# Patient Record
Sex: FEMALE | Race: ASIAN | NOT HISPANIC OR LATINO | URBAN - METROPOLITAN AREA
[De-identification: names, ages, dates, MRNs, and addresses within clinical notes are randomized per-mention and may not be internally consistent; named-entity substitution may affect disease eponyms.]

---

## 2018-03-21 ENCOUNTER — EMERGENCY (EMERGENCY)
Facility: HOSPITAL | Age: 68
LOS: 1 days | Discharge: HOME | End: 2018-03-21
Admitting: EMERGENCY MEDICINE
Payer: MEDICAID

## 2018-03-21 VITALS
HEART RATE: 108 BPM | TEMPERATURE: 97 F | SYSTOLIC BLOOD PRESSURE: 155 MMHG | RESPIRATION RATE: 18 BRPM | DIASTOLIC BLOOD PRESSURE: 68 MMHG | OXYGEN SATURATION: 99 %

## 2018-03-21 DIAGNOSIS — Z98.890 OTHER SPECIFIED POSTPROCEDURAL STATES: Chronic | ICD-10-CM

## 2018-03-21 PROCEDURE — 73070 X-RAY EXAM OF ELBOW: CPT | Mod: 26,LT

## 2018-03-21 PROCEDURE — 99284 EMERGENCY DEPT VISIT MOD MDM: CPT

## 2018-03-21 PROCEDURE — 73030 X-RAY EXAM OF SHOULDER: CPT | Mod: 26,LT

## 2018-03-21 PROCEDURE — 73060 X-RAY EXAM OF HUMERUS: CPT | Mod: 26,LT

## 2018-03-21 RX ORDER — OXYCODONE AND ACETAMINOPHEN 5; 325 MG/1; MG/1
1 TABLET ORAL ONCE
Qty: 0 | Refills: 0 | Status: DISCONTINUED | OUTPATIENT
Start: 2018-03-21 | End: 2018-03-21

## 2018-03-21 RX ORDER — OXYCODONE AND ACETAMINOPHEN 5; 325 MG/1; MG/1
1 TABLET ORAL
Qty: 12 | Refills: 0
Start: 2018-03-21 | End: 2018-03-23

## 2018-03-21 RX ORDER — IBUPROFEN 200 MG
1 TABLET ORAL
Qty: 20 | Refills: 0
Start: 2018-03-21 | End: 2018-03-25

## 2018-03-21 RX ADMIN — OXYCODONE AND ACETAMINOPHEN 1 TABLET(S): 5; 325 TABLET ORAL at 16:12

## 2018-03-21 NOTE — ED ADULT TRIAGE NOTE - CHIEF COMPLAINT QUOTE
# [id number]  s/p fall.  Trip and fall yesterday.  Denies head trauma and no dizziness. Arrives with makeshift sling in place to left arm.  Positive radial pulse, capillary refill

## 2018-03-21 NOTE — ED PROVIDER NOTE - PLAN OF CARE
Percocet 1 tablet every 6 hrs as needed for breakthrough discomfort- caution drowsiness while taking this medication- do not drive or operate heavy machinery. Limit further injury, over exertion, and rest affected area. Take motrin 600mg every 6h as needed for pain. Please continue all home medications as directed. See your regular doctor within 72 hours for follow-up care. Return to ER for new or worsening symptoms.

## 2018-03-21 NOTE — ED PROVIDER NOTE - OBJECTIVE STATEMENT
68 year old female with a PMHx of breast ca s/p bilateral mastectomy, DM, HLD pw left shoulder, left elbow pain s/p mechanical fall yesterday. Pt slipped on ice and fell onto her left elbow. Pt has decreased ROM of shoulder. Denies head trauma, blood thinner use, neck pain, n/v/f/c, CP, SOB, abdominal pain, dysuria, hematuria, melena, hematochezia. 68 year old female with a PMHx of breast ca s/p bilateral mastectomy, DM, HLD pw left shoulder, left elbow pain s/p mechanical fall yesterday. Pt slipped on ice and fell onto her left elbow. Pt has decreased ROM of shoulder. Denies head trauma, blood thinner use, neck pain, n/v/f/c, CP, SOB, abdominal pain, dysuria, hematuria, melena, hematochezia.  Pt English speaking - son at bedside - prefers using son to translate.

## 2018-03-21 NOTE — ED PROVIDER NOTE - PROGRESS NOTE DETAILS
DOUG Roth: spoke with orthopedics resident - wants the patient to be put in a sling and follow up with Dr. Diggs within 1 week DOUG Roth: spoke with orthopedics resident - wants the patient to be put in a sling and follow up with Dr. Diggs within 1 week. As per ortho no need for reduction/surgical intervention.

## 2018-03-21 NOTE — ED PROVIDER NOTE - CARE PLAN
Principal Discharge DX:	Humerus fracture  Assessment and plan of treatment:	Percocet 1 tablet every 6 hrs as needed for breakthrough discomfort- caution drowsiness while taking this medication- do not drive or operate heavy machinery. Limit further injury, over exertion, and rest affected area. Take motrin 600mg every 6h as needed for pain. Please continue all home medications as directed. See your regular doctor within 72 hours for follow-up care. Return to ER for new or worsening symptoms.

## 2018-03-21 NOTE — ED PROVIDER NOTE - MEDICAL DECISION MAKING DETAILS
68 year old female with a PMHx of breast ca s/p bilateral mastectomy, DM, HLD pw left shoulder, left elbow pain s/p mechanical fall yesterday.   Plan: pain management, xray shoulder/elbow/humerus r/o fracture

## 2018-03-22 PROBLEM — Z00.00 ENCOUNTER FOR PREVENTIVE HEALTH EXAMINATION: Status: ACTIVE | Noted: 2018-03-22

## 2018-03-26 ENCOUNTER — APPOINTMENT (OUTPATIENT)
Dept: ORTHOPEDIC SURGERY | Facility: CLINIC | Age: 68
End: 2018-03-26
Payer: MEDICAID

## 2018-03-26 VITALS
WEIGHT: 138 LBS | SYSTOLIC BLOOD PRESSURE: 168 MMHG | HEIGHT: 63 IN | RESPIRATION RATE: 16 BRPM | BODY MASS INDEX: 24.45 KG/M2 | HEART RATE: 96 BPM | DIASTOLIC BLOOD PRESSURE: 85 MMHG

## 2018-03-26 DIAGNOSIS — S42.292A OTHER DISPLACED FRACTURE OF UPPER END OF LEFT HUMERUS, INITIAL ENCOUNTER FOR CLOSED FRACTURE: ICD-10-CM

## 2018-03-26 PROCEDURE — 73030 X-RAY EXAM OF SHOULDER: CPT | Mod: LT

## 2018-03-26 PROCEDURE — 99203 OFFICE O/P NEW LOW 30 MIN: CPT

## 2018-03-26 PROCEDURE — 73060 X-RAY EXAM OF HUMERUS: CPT | Mod: LT

## 2018-03-30 PROBLEM — S42.292A OTHER CLOSED DISPLACED FRACTURE OF PROXIMAL END OF LEFT HUMERUS, INITIAL ENCOUNTER: Status: ACTIVE | Noted: 2018-03-26

## 2023-01-02 ENCOUNTER — TRANSCRIPTION ENCOUNTER (OUTPATIENT)
Age: 73
End: 2023-01-02

## 2023-01-02 ENCOUNTER — INPATIENT (INPATIENT)
Facility: HOSPITAL | Age: 73
LOS: 1 days | Discharge: HOME HEALTH | End: 2023-01-04
Attending: INTERNAL MEDICINE | Admitting: INTERNAL MEDICINE
Payer: MEDICAID

## 2023-01-02 VITALS
OXYGEN SATURATION: 100 % | HEART RATE: 78 BPM | SYSTOLIC BLOOD PRESSURE: 175 MMHG | DIASTOLIC BLOOD PRESSURE: 71 MMHG | RESPIRATION RATE: 17 BRPM | TEMPERATURE: 97 F

## 2023-01-02 DIAGNOSIS — R19.7 DIARRHEA, UNSPECIFIED: ICD-10-CM

## 2023-01-02 DIAGNOSIS — R53.1 WEAKNESS: ICD-10-CM

## 2023-01-02 DIAGNOSIS — Z98.890 OTHER SPECIFIED POSTPROCEDURAL STATES: Chronic | ICD-10-CM

## 2023-01-02 DIAGNOSIS — E16.2 HYPOGLYCEMIA, UNSPECIFIED: ICD-10-CM

## 2023-01-02 DIAGNOSIS — E11.9 TYPE 2 DIABETES MELLITUS WITHOUT COMPLICATIONS: ICD-10-CM

## 2023-01-02 DIAGNOSIS — E87.20 ACIDOSIS, UNSPECIFIED: ICD-10-CM

## 2023-01-02 DIAGNOSIS — E87.1 HYPO-OSMOLALITY AND HYPONATREMIA: ICD-10-CM

## 2023-01-02 DIAGNOSIS — Z29.9 ENCOUNTER FOR PROPHYLACTIC MEASURES, UNSPECIFIED: ICD-10-CM

## 2023-01-02 PROBLEM — C50.919 MALIGNANT NEOPLASM OF UNSPECIFIED SITE OF UNSPECIFIED FEMALE BREAST: Chronic | Status: ACTIVE | Noted: 2018-03-21

## 2023-01-02 PROBLEM — E78.5 HYPERLIPIDEMIA, UNSPECIFIED: Chronic | Status: ACTIVE | Noted: 2018-03-21

## 2023-01-02 LAB
A1C WITH ESTIMATED AVERAGE GLUCOSE RESULT: 6.3 % — HIGH (ref 4–5.6)
ALBUMIN SERPL ELPH-MCNC: 4.3 G/DL — SIGNIFICANT CHANGE UP (ref 3.3–5)
ALP SERPL-CCNC: 56 U/L — SIGNIFICANT CHANGE UP (ref 40–120)
ALT FLD-CCNC: 13 U/L — SIGNIFICANT CHANGE UP (ref 4–33)
ANION GAP SERPL CALC-SCNC: 14 MMOL/L — SIGNIFICANT CHANGE UP (ref 7–14)
APPEARANCE UR: CLEAR — SIGNIFICANT CHANGE UP
AST SERPL-CCNC: 20 U/L — SIGNIFICANT CHANGE UP (ref 4–32)
B PERT DNA SPEC QL NAA+PROBE: SIGNIFICANT CHANGE UP
B PERT+PARAPERT DNA PNL SPEC NAA+PROBE: SIGNIFICANT CHANGE UP
BASOPHILS # BLD AUTO: 0.01 K/UL — SIGNIFICANT CHANGE UP (ref 0–0.2)
BASOPHILS NFR BLD AUTO: 0.1 % — SIGNIFICANT CHANGE UP (ref 0–2)
BILIRUB SERPL-MCNC: 0.2 MG/DL — SIGNIFICANT CHANGE UP (ref 0.2–1.2)
BILIRUB UR-MCNC: NEGATIVE — SIGNIFICANT CHANGE UP
BLOOD GAS VENOUS COMPREHENSIVE RESULT: SIGNIFICANT CHANGE UP
BLOOD GAS VENOUS COMPREHENSIVE RESULT: SIGNIFICANT CHANGE UP
BORDETELLA PARAPERTUSSIS (RAPRVP): SIGNIFICANT CHANGE UP
BUN SERPL-MCNC: 8 MG/DL — SIGNIFICANT CHANGE UP (ref 7–23)
C DIFF BY PCR RESULT: SIGNIFICANT CHANGE UP
C PNEUM DNA SPEC QL NAA+PROBE: SIGNIFICANT CHANGE UP
CALCIUM SERPL-MCNC: 8.9 MG/DL — SIGNIFICANT CHANGE UP (ref 8.4–10.5)
CHLORIDE SERPL-SCNC: 95 MMOL/L — LOW (ref 98–107)
CO2 SERPL-SCNC: 22 MMOL/L — SIGNIFICANT CHANGE UP (ref 22–31)
COLOR SPEC: COLORLESS — SIGNIFICANT CHANGE UP
CREAT SERPL-MCNC: 0.54 MG/DL — SIGNIFICANT CHANGE UP (ref 0.5–1.3)
DIFF PNL FLD: NEGATIVE — SIGNIFICANT CHANGE UP
EGFR: 99 ML/MIN/1.73M2 — SIGNIFICANT CHANGE UP
EOSINOPHIL # BLD AUTO: 0.04 K/UL — SIGNIFICANT CHANGE UP (ref 0–0.5)
EOSINOPHIL NFR BLD AUTO: 0.4 % — SIGNIFICANT CHANGE UP (ref 0–6)
ESTIMATED AVERAGE GLUCOSE: 134 — SIGNIFICANT CHANGE UP
FLUAV SUBTYP SPEC NAA+PROBE: SIGNIFICANT CHANGE UP
FLUBV RNA SPEC QL NAA+PROBE: SIGNIFICANT CHANGE UP
GI PCR PANEL: SIGNIFICANT CHANGE UP
GLUCOSE BLDC GLUCOMTR-MCNC: 206 MG/DL — HIGH (ref 70–99)
GLUCOSE BLDC GLUCOMTR-MCNC: 275 MG/DL — HIGH (ref 70–99)
GLUCOSE SERPL-MCNC: 129 MG/DL — HIGH (ref 70–99)
GLUCOSE UR QL: ABNORMAL
HADV DNA SPEC QL NAA+PROBE: SIGNIFICANT CHANGE UP
HCOV 229E RNA SPEC QL NAA+PROBE: SIGNIFICANT CHANGE UP
HCOV HKU1 RNA SPEC QL NAA+PROBE: SIGNIFICANT CHANGE UP
HCOV NL63 RNA SPEC QL NAA+PROBE: SIGNIFICANT CHANGE UP
HCOV OC43 RNA SPEC QL NAA+PROBE: SIGNIFICANT CHANGE UP
HCT VFR BLD CALC: 32.1 % — LOW (ref 34.5–45)
HGB BLD-MCNC: 10.3 G/DL — LOW (ref 11.5–15.5)
HMPV RNA SPEC QL NAA+PROBE: SIGNIFICANT CHANGE UP
HPIV1 RNA SPEC QL NAA+PROBE: SIGNIFICANT CHANGE UP
HPIV2 RNA SPEC QL NAA+PROBE: SIGNIFICANT CHANGE UP
HPIV3 RNA SPEC QL NAA+PROBE: SIGNIFICANT CHANGE UP
HPIV4 RNA SPEC QL NAA+PROBE: SIGNIFICANT CHANGE UP
IANC: 7.03 K/UL — SIGNIFICANT CHANGE UP (ref 1.8–7.4)
IMM GRANULOCYTES NFR BLD AUTO: 0.4 % — SIGNIFICANT CHANGE UP (ref 0–0.9)
KETONES UR-MCNC: NEGATIVE — SIGNIFICANT CHANGE UP
LACTATE SERPL-SCNC: 2.8 MMOL/L — HIGH (ref 0.5–2)
LEUKOCYTE ESTERASE UR-ACNC: NEGATIVE — SIGNIFICANT CHANGE UP
LIDOCAIN IGE QN: 171 U/L — HIGH (ref 7–60)
LYMPHOCYTES # BLD AUTO: 1.56 K/UL — SIGNIFICANT CHANGE UP (ref 1–3.3)
LYMPHOCYTES # BLD AUTO: 17.2 % — SIGNIFICANT CHANGE UP (ref 13–44)
M PNEUMO DNA SPEC QL NAA+PROBE: SIGNIFICANT CHANGE UP
MAGNESIUM SERPL-MCNC: 1.5 MG/DL — LOW (ref 1.6–2.6)
MCHC RBC-ENTMCNC: 26.9 PG — LOW (ref 27–34)
MCHC RBC-ENTMCNC: 32.1 GM/DL — SIGNIFICANT CHANGE UP (ref 32–36)
MCV RBC AUTO: 83.8 FL — SIGNIFICANT CHANGE UP (ref 80–100)
MONOCYTES # BLD AUTO: 0.41 K/UL — SIGNIFICANT CHANGE UP (ref 0–0.9)
MONOCYTES NFR BLD AUTO: 4.5 % — SIGNIFICANT CHANGE UP (ref 2–14)
NEUTROPHILS # BLD AUTO: 7.03 K/UL — SIGNIFICANT CHANGE UP (ref 1.8–7.4)
NEUTROPHILS NFR BLD AUTO: 77.4 % — HIGH (ref 43–77)
NITRITE UR-MCNC: NEGATIVE — SIGNIFICANT CHANGE UP
NRBC # BLD: 0 /100 WBCS — SIGNIFICANT CHANGE UP (ref 0–0)
NRBC # FLD: 0 K/UL — SIGNIFICANT CHANGE UP (ref 0–0)
PH UR: 7 — SIGNIFICANT CHANGE UP (ref 5–8)
PHOSPHATE SERPL-MCNC: 3.3 MG/DL — SIGNIFICANT CHANGE UP (ref 2.5–4.5)
PLATELET # BLD AUTO: 182 K/UL — SIGNIFICANT CHANGE UP (ref 150–400)
POTASSIUM SERPL-MCNC: 4 MMOL/L — SIGNIFICANT CHANGE UP (ref 3.5–5.3)
POTASSIUM SERPL-SCNC: 4 MMOL/L — SIGNIFICANT CHANGE UP (ref 3.5–5.3)
PROT SERPL-MCNC: 6.8 G/DL — SIGNIFICANT CHANGE UP (ref 6–8.3)
PROT UR-MCNC: NEGATIVE — SIGNIFICANT CHANGE UP
RAPID RVP RESULT: SIGNIFICANT CHANGE UP
RBC # BLD: 3.83 M/UL — SIGNIFICANT CHANGE UP (ref 3.8–5.2)
RBC # FLD: 13.3 % — SIGNIFICANT CHANGE UP (ref 10.3–14.5)
RSV RNA SPEC QL NAA+PROBE: SIGNIFICANT CHANGE UP
RV+EV RNA SPEC QL NAA+PROBE: SIGNIFICANT CHANGE UP
SARS-COV-2 RNA SPEC QL NAA+PROBE: SIGNIFICANT CHANGE UP
SODIUM SERPL-SCNC: 131 MMOL/L — LOW (ref 135–145)
SP GR SPEC: 1.01 — SIGNIFICANT CHANGE UP (ref 1.01–1.05)
UROBILINOGEN FLD QL: SIGNIFICANT CHANGE UP
WBC # BLD: 9.09 K/UL — SIGNIFICANT CHANGE UP (ref 3.8–10.5)
WBC # FLD AUTO: 9.09 K/UL — SIGNIFICANT CHANGE UP (ref 3.8–10.5)

## 2023-01-02 PROCEDURE — 74177 CT ABD & PELVIS W/CONTRAST: CPT | Mod: 26,MA

## 2023-01-02 PROCEDURE — 99223 1ST HOSP IP/OBS HIGH 75: CPT

## 2023-01-02 PROCEDURE — 99285 EMERGENCY DEPT VISIT HI MDM: CPT

## 2023-01-02 RX ORDER — ONDANSETRON 8 MG/1
4 TABLET, FILM COATED ORAL ONCE
Refills: 0 | Status: COMPLETED | OUTPATIENT
Start: 2023-01-02 | End: 2023-01-02

## 2023-01-02 RX ORDER — INSULIN LISPRO 100/ML
VIAL (ML) SUBCUTANEOUS
Refills: 0 | Status: DISCONTINUED | OUTPATIENT
Start: 2023-01-02 | End: 2023-01-04

## 2023-01-02 RX ORDER — GLUCAGON INJECTION, SOLUTION 0.5 MG/.1ML
1 INJECTION, SOLUTION SUBCUTANEOUS ONCE
Refills: 0 | Status: DISCONTINUED | OUTPATIENT
Start: 2023-01-02 | End: 2023-01-04

## 2023-01-02 RX ORDER — DEXTROSE 50 % IN WATER 50 %
25 SYRINGE (ML) INTRAVENOUS ONCE
Refills: 0 | Status: DISCONTINUED | OUTPATIENT
Start: 2023-01-02 | End: 2023-01-04

## 2023-01-02 RX ORDER — GABAPENTIN 400 MG/1
300 CAPSULE ORAL AT BEDTIME
Refills: 0 | Status: DISCONTINUED | OUTPATIENT
Start: 2023-01-02 | End: 2023-01-04

## 2023-01-02 RX ORDER — ACETAMINOPHEN 500 MG
650 TABLET ORAL EVERY 6 HOURS
Refills: 0 | Status: DISCONTINUED | OUTPATIENT
Start: 2023-01-02 | End: 2023-01-04

## 2023-01-02 RX ORDER — SODIUM CHLORIDE 9 MG/ML
1000 INJECTION INTRAMUSCULAR; INTRAVENOUS; SUBCUTANEOUS ONCE
Refills: 0 | Status: COMPLETED | OUTPATIENT
Start: 2023-01-02 | End: 2023-01-02

## 2023-01-02 RX ORDER — ENOXAPARIN SODIUM 100 MG/ML
40 INJECTION SUBCUTANEOUS EVERY 24 HOURS
Refills: 0 | Status: DISCONTINUED | OUTPATIENT
Start: 2023-01-02 | End: 2023-01-04

## 2023-01-02 RX ORDER — SIMVASTATIN 20 MG/1
20 TABLET, FILM COATED ORAL AT BEDTIME
Refills: 0 | Status: DISCONTINUED | OUTPATIENT
Start: 2023-01-02 | End: 2023-01-04

## 2023-01-02 RX ORDER — DEXTROSE 50 % IN WATER 50 %
15 SYRINGE (ML) INTRAVENOUS ONCE
Refills: 0 | Status: DISCONTINUED | OUTPATIENT
Start: 2023-01-02 | End: 2023-01-04

## 2023-01-02 RX ORDER — SODIUM CHLORIDE 9 MG/ML
1000 INJECTION, SOLUTION INTRAVENOUS
Refills: 0 | Status: DISCONTINUED | OUTPATIENT
Start: 2023-01-02 | End: 2023-01-04

## 2023-01-02 RX ORDER — DEXTROSE 50 % IN WATER 50 %
50 SYRINGE (ML) INTRAVENOUS ONCE
Refills: 0 | Status: COMPLETED | OUTPATIENT
Start: 2023-01-02 | End: 2023-01-02

## 2023-01-02 RX ORDER — MAGNESIUM SULFATE 500 MG/ML
2 VIAL (ML) INJECTION ONCE
Refills: 0 | Status: COMPLETED | OUTPATIENT
Start: 2023-01-02 | End: 2023-01-02

## 2023-01-02 RX ORDER — DEXTROSE 50 % IN WATER 50 %
12.5 SYRINGE (ML) INTRAVENOUS ONCE
Refills: 0 | Status: DISCONTINUED | OUTPATIENT
Start: 2023-01-02 | End: 2023-01-04

## 2023-01-02 RX ORDER — ONDANSETRON 8 MG/1
4 TABLET, FILM COATED ORAL EVERY 8 HOURS
Refills: 0 | Status: DISCONTINUED | OUTPATIENT
Start: 2023-01-02 | End: 2023-01-04

## 2023-01-02 RX ORDER — LANOLIN ALCOHOL/MO/W.PET/CERES
3 CREAM (GRAM) TOPICAL AT BEDTIME
Refills: 0 | Status: DISCONTINUED | OUTPATIENT
Start: 2023-01-02 | End: 2023-01-04

## 2023-01-02 RX ORDER — SODIUM CHLORIDE 9 MG/ML
1000 INJECTION, SOLUTION INTRAVENOUS
Refills: 0 | Status: DISCONTINUED | OUTPATIENT
Start: 2023-01-02 | End: 2023-01-03

## 2023-01-02 RX ADMIN — SODIUM CHLORIDE 1000 MILLILITER(S): 9 INJECTION INTRAMUSCULAR; INTRAVENOUS; SUBCUTANEOUS at 10:53

## 2023-01-02 RX ADMIN — Medication 25 GRAM(S): at 14:40

## 2023-01-02 RX ADMIN — ONDANSETRON 4 MILLIGRAM(S): 8 TABLET, FILM COATED ORAL at 11:08

## 2023-01-02 RX ADMIN — SODIUM CHLORIDE 100 MILLILITER(S): 9 INJECTION, SOLUTION INTRAVENOUS at 14:41

## 2023-01-02 RX ADMIN — Medication 50 MILLILITER(S): at 10:54

## 2023-01-02 RX ADMIN — Medication 3: at 22:50

## 2023-01-02 RX ADMIN — SODIUM CHLORIDE 1000 MILLILITER(S): 9 INJECTION INTRAMUSCULAR; INTRAVENOUS; SUBCUTANEOUS at 14:40

## 2023-01-02 RX ADMIN — GABAPENTIN 300 MILLIGRAM(S): 400 CAPSULE ORAL at 22:44

## 2023-01-02 NOTE — H&P ADULT - PROBLEM SELECTOR PLAN 1
Will keep track of BMs, if patient continues to persistent loose BMs will get stool studies and c.diff  WBC normal, no fevers noted  CT a/p unremarkable   Continue gentle IVF for now  Potentially in the setting of Janumet and Metfromin?

## 2023-01-02 NOTE — H&P ADULT - ASSESSMENT
72 year old lady h/o breast CA s/p B/L mastectomy, T2DM (Janumet/metformin/glimizide, HLD) p/w weakness and non specific GI complaints which have improved over the last two weeks. CT A/P appears mostly unremarkable, patient w/lactic acidosis on admission, though improving on subsequent draws. Also with hyponatremia, possibly 2/2 volume depletion.

## 2023-01-02 NOTE — ED PROVIDER NOTE - NSICDXPASTMEDICALHX_GEN_ALL_CORE_FT
PAST MEDICAL HISTORY:  Breast CA     Diabetes     HLD (hyperlipidemia)      DM 2 , does not monitor BG

## 2023-01-02 NOTE — H&P ADULT - PROBLEM SELECTOR PLAN 6
Lovenox 40 mg daily Lovenox 40 mg daily  Hold treatment for H.Pylori (no evidence of infection) Likely 2/2 hypovolemia  Check with AM labs

## 2023-01-02 NOTE — ED PROVIDER NOTE - OBJECTIVE STATEMENT
73yo F with PMHx of breast ca s/p bilateral mastectomy, DM on Janumet/metformin/glimiperide, HLD presents to ED for eval for weakness. For last 2wk she's had nausea/vomiting & watery diarrhea - not worsening but not improving. Intolerance to solids, with 'sticking' sensation but mostly nausea/vomiting but able take soft and liquid foods. Went to pcp 3d ago who gave her triple abbx tx for suspected Hpylori. Started having GI issues 4yo ago and had a ecd/colo with Dr Rendon w/o result. Today per son pt was weak and unable to get off couch with some 'tingling' of upper extremities which is how EMS found her. FS w/ EMS ~80s. Pt denies . On arrival FS 42. Denies localized weakness, loss of sensation, HA, visual changes, CP, SOB. Has some generalized discomfort and bloating but not abd pain. No blood in stool. 71yo F with PMHx of breast ca s/p bilateral mastectomy, DM on Janumet/metformin/glimiperide, HLD presents to ED for eval for weakness. For last 2wk she's had nausea/vomiting & watery diarrhea - not worsening but not improving. Intolerance to solids, with 'sticking' sensation but mostly nausea/vomiting but able take soft and liquid foods. Went to pcp 3d ago who gave her triple abbx tx for suspected Hpylori. Started having GI issues 4yo ago and had a ecd/colo with Dr Rendon w/o result. Today per son pt was weak and unable to get off couch with some 'tingling' of upper extremities which is how EMS found her. FS w/ EMS ~80s. Pt denies . On arrival FS 42. Denies localized weakness, loss of sensation, HA, visual changes, CP, SOB. Has some generalized discomfort and bloating but not abd pain. No blood in stool.    Attending/Chang: 71 yo F as described above, ho DM p/w ~two weeks fo nonbloody n/v/d with mild epigastric discomfort. Denies fever/chills, chest pain, SOB or palp. No recent traveling, no sick contacts. Pt son, who is at bedside, that ~one year ago his mother had an EGD/Colonoscopy, which as reportedly normal. Pt seen and eval by her PMD three days ago for the above complaints and stated treated for H/pylori. Pt here for continued n/v/d and now increase fatigue and generalized weakness. 71yo F with PMHx of breast ca s/p bilateral mastectomy, DM on Janumet/metformin/glimizide, HLD presents to ED for eval for weakness. For last 2wk she's had nausea/vomiting & watery diarrhea - not worsening but not improving. Intolerance to solids, with 'sticking' sensation but mostly nausea/vomiting but able take soft and liquid foods. Went to pcp 3d ago who gave her triple abbx tx for suspected Hpylori. Started having GI issues 4yo ago and had a ecd/colo with Dr Rendon w/o result. Today per son pt was weak and unable to get off couch with some 'tingling' of upper extremities which is how EMS found her. FS w/ EMS ~80s. Pt denies . On arrival FS 42. Denies localized weakness, loss of sensation, HA, visual changes, CP, SOB. Has some generalized discomfort and bloating but not abd pain. No blood in stool.    Attending/Chang: 73 yo F as described above, ho DM p/w ~two weeks fo nonbloody n/v/d with mild epigastric discomfort. Denies fever/chills, chest pain, SOB or palp. No recent traveling, no sick contacts. Pt son, who is at bedside, that ~one year ago his mother had an EGD/Colonoscopy, which as reportedly normal. Pt seen and eval by her PMD three days ago for the above complaints and stated treated for H/pylori. Pt here for continued n/v/d and now increase fatigue and generalized weakness.

## 2023-01-02 NOTE — ED PROVIDER NOTE - PHYSICAL EXAMINATION
CONSTITUTIONAL: thin tired appearing F, AAOx3 but fatigued  SKIN: Warm dry  HEAD: NCAT  EYES: NL inspection  ENT: dry oral mucosa  NECK: Supple; non tender.  CARD: RRR  RESP: CTAB  ABD: soft, NTND, normoactive bowel sounds  EXT: no pedal edema  NEURO: Grossly non focal, no drift, intact touch sensation, generally feels weak but moving all limbs to command. cranial nerves grossly intact   PSYCH: Cooperative, appropriate. CONSTITUTIONAL: thin tired appearing F, AAOx3 but fatigued  SKIN: Warm dry  HEAD: NCAT  EYES: NL inspection  ENT: dry oral mucosa  NECK: Supple; non tender.  CARD: RRR  RESP: CTAB  ABD: soft, NTND, normoactive bowel sounds  EXT: no pedal edema  NEURO: Grossly non focal, no drift, intact touch sensation, generally feels weak but moving all limbs to command. cranial nerves grossly intact   PSYCH: Cooperative, appropriate.    Attending/Chang: NAD; PERRL/EOMI, non-icterus, +dry mucosa, supple, no LEANNE, no JVD, RRR, CTAB; Abd-soft, NT/ND, no HSM; no LE edema, A&Ox3, nonfocal; Skin-warm/dry

## 2023-01-02 NOTE — ED PROVIDER NOTE - PROGRESS NOTE DETAILS
Ferdinand PGY2: given lactate and persistent falling glucose will admit. Discussed with son and pt who didn't want to stay but explained situation and agreeable to admission.

## 2023-01-02 NOTE — ED ADULT NURSE REASSESSMENT NOTE - NS ED NURSE REASSESS COMMENT FT1
Pt in NAD, resp equal and unlabored. No complaints at this time. Denies; abd pain, n/v, confusion, extreme thirst, fever/chills, CP, SOB.

## 2023-01-02 NOTE — ED ADULT TRIAGE NOTE - CHIEF COMPLAINT QUOTE
pt c/o vomiting x 1 week, woke up with generalized weakness, states she woke up with numbness to b/l arms with dizziness. MD Montiel to triage, no code stroke called.  pt noted to be hypoglycemic in triage. brought directly to room #22

## 2023-01-02 NOTE — ED PROVIDER NOTE - CLINICAL SUMMARY MEDICAL DECISION MAKING FREE TEXT BOX
Ferdinand PGY2: 71yo F with hx breast CA, DM on glimiperide, HLD presents for 1wk of nausea/vomiting, weakness found to be hypoglycemic. On exam generally weak but no focal neurological deficit. Abd exam benign. Diff includes but not limited to gastroenteritis vs H.pylori gastritis vs less likely CA given 4yo EGD/colo neg vs less likely SBO vs no clear risks other than age for Cdiff. Will check labs, lipase, A1c, CT a/p, and if patient has diarrhea  - will send sample. Likely will need to be admitted for weakness and fluid resuscitation. Pertinent chart reviewed, discussed hx with son at bedside.

## 2023-01-02 NOTE — ED ADULT NURSE NOTE - OBJECTIVE STATEMENT
received pt in room 22, 72 yr/o female A+OX4, Japanese speaking, pt prefers son at bedside for translation. PMH of breast ca with bilateral mastectomy (in remission), DMII on Janumet/metformin/glimiperide, and HLD. pt presented to the ED C/O nausea weakness for 2 weeks, and dizziness that started this morning.  as per son pt was being followed by PCP regarding abdominal pain and weakness, scheduled for an MRI tomorrow. no neuro deficits noted. VSS, denies chest pain and SOB, RR even and unlabored. pts FS was below 100 on arrival to ED, apple juice and meds given as ordered. left AC 20g placed, labs drawn and sent, meds tolerated well will continue to monitor.

## 2023-01-02 NOTE — H&P ADULT - HISTORY OF PRESENT ILLNESS
72 year old lady h/o breast CA s/p B/L mastectomy, T2DM (Janumet/metformin/glimizide, HLD) p/w weakness and non specific GI complaints which have improved over the last two weeks. Patient states that for the last two weeks she has multijple episodes of N/V and diarrhea, though they have been improving. However, the patient has had decreased PO intake as a result of these symptoms. Last EGD/Colonoscopy was about a year ago and as per patient, was normal. She recently saw her PMD and was prescribed a course of H. Pylori treatment. Patient with some abdominal discomfort, however denies intractable pain or bloody stools. Upon arrival patient noted to have FS of 42.         PAST MEDICAL & SURGICAL HISTORY:  Diabetes      Breast CA      HLD (hyperlipidemia)      H/O bilateral mastectomy          Review of Systems:   CONSTITUTIONAL: No fever, weight loss, or fatigue  EYES: No eye pain, visual disturbances, or discharge  ENMT:  No difficulty hearing, tinnitus, vertigo; No sinus or throat pain  NECK: No pain or stiffness  BREASTS: No pain, masses, or nipple discharge  RESPIRATORY: No cough, wheezing, chills or hemoptysis; No shortness of breath  CARDIOVASCULAR: No chest pain, palpitations, dizziness, or leg swelling  GASTROINTESTINAL: As per HPI  GENITOURINARY: No dysuria, frequency, hematuria, or incontinence  NEUROLOGICAL: No headaches, memory loss, loss of strength, numbness, or tremors  SKIN: No itching, burning, rashes, or lesions   LYMPH NODES: No enlarged glands  ENDOCRINE: No heat or cold intolerance; No hair loss  MUSCULOSKELETAL: No joint pain or swelling; No muscle, back, or extremity pain  PSYCHIATRIC: No depression, anxiety, mood swings, or difficulty sleeping  HEME/LYMPH: No easy bruising, or bleeding gums  ALLERGY AND IMMUNOLOGIC: No hives or eczema    Allergies    No Known Allergies    Intolerances        Social History: Denies actively smoking, drinking or drug use    FAMILY HISTORY: Not pertinent family history      MEDICATIONS  (STANDING):  dextrose 5% + sodium chloride 0.9%. 1000 milliLiter(s) (100 mL/Hr) IV Continuous <Continuous>  dextrose 5%. 1000 milliLiter(s) (50 mL/Hr) IV Continuous <Continuous>  dextrose 5%. 1000 milliLiter(s) (100 mL/Hr) IV Continuous <Continuous>  dextrose 50% Injectable 25 Gram(s) IV Push once  dextrose 50% Injectable 12.5 Gram(s) IV Push once  dextrose 50% Injectable 25 Gram(s) IV Push once  glucagon  Injectable 1 milliGRAM(s) IntraMuscular once    MEDICATIONS  (PRN):  dextrose Oral Gel 15 Gram(s) Oral once PRN Blood Glucose LESS THAN 70 milliGRAM(s)/deciliter      T(C): 36.6 (23 @ 13:21), Max: 36.6 (23 @ 13:21)  HR: 86 (23 @ 13:21) (78 - 86)  BP: 122/99 (23 @ 13:21) (122/99 - 175/71)  RR: 16 (23 @ 13:21) (16 - 17)  SpO2: 100% (23 @ 13:21) (100% - 100%)    CAPILLARY BLOOD GLUCOSE      POCT Blood Glucose.: 92 mg/dL (2023 13:24)  POCT Blood Glucose.: 217 mg/dL (2023 11:21)  POCT Blood Glucose.: 82 mg/dL (2023 10:35)  POCT Blood Glucose.: 42 mg/dL (2023 10:08)  POCT Blood Glucose.: 53 mg/dL (2023 10:08)    I&O's Summary      PHYSICAL EXAM:  GENERAL: NAD, well-developed  HEAD:  Atraumatic, Normocephalic  EYES: EOMI, PERRLA, conjunctiva and sclera clear  NECK: Supple, No elevated JVD  CHEST/LUNG: Clear to auscultation bilaterally; No wheeze  HEART: Regular rate and rhythm; No murmurs, rubs, or gallops  ABDOMEN: Soft, Nontender, Nondistended; Bowel sounds present  EXTREMITIES:  2+ Peripheral Pulses, No clubbing, cyanosis, or edema  PSYCH: AAOx3  NEUROLOGY: CN II-XII grossly intact, moving all extremities  SKIN: No rashes or lesions    LABS:                        10.3   9.09  )-----------( 182      ( 2023 10:58 )             32.1     01-02    131<L>  |  95<L>  |  8   ----------------------------<  129<H>  4.0   |  22  |  0.54    Ca    8.9      2023 10:58  Phos  3.3     -  Mg     1.50     -    TPro  6.8  /  Alb  4.3  /  TBili  0.2  /  DBili  x   /  AST  20  /  ALT  13  /  AlkPhos  56            Urinalysis Basic - ( 2023 14:00 )    Color: Colorless / Appearance: Clear / S.013 / pH: x  Gluc: x / Ketone: Negative  / Bili: Negative / Urobili: <2 mg/dL   Blood: x / Protein: Negative / Nitrite: Negative   Leuk Esterase: Negative / RBC: x / WBC x   Sq Epi: x / Non Sq Epi: x / Bacteria: x          RADIOLOGY & ADDITIONAL TESTS:    ECG Personally Reviewed -     Imaging Personally Reviewed:    NTERPRETATION:  CLINICAL INFORMATION: 2 weeks of persistent GI symptoms,   nausea, vomiting, diarrhea evaluate for acute process.    COMPARISON: None.    CONTRAST/COMPLICATIONS:  IV Contrast: Omnipaque 350  90 cc administered   10 cc discarded  Oral Contrast: NONE  Complications: None reported at time of study completion    PROCEDURE:  CT of the Abdomen and Pelvis was performed.  Sagittal and coronal reformats were performed.    FINDINGS:  LOWER CHEST: Bibasilar atelectasis. Coronary artery and aortic   calcifications.    LIVER: Within normal limits.  BILE DUCTS: Dilation of the intra and extra hepatic bile ducts likely   representing postcholecystectomy state.  GALLBLADDER: Cholecystectomy.  SPLEEN: Within normal limits.  PANCREAS: Within normal limits.  ADRENALS: Within normal limits.  KIDNEYS/URETERS: Right-sided renal cyst with additional hypodense foci   too small to characterize.    BLADDER: Distended bladder.  REPRODUCTIVE ORGANS: Uterus and adnexa within normal limits.    BOWEL: Normal appendix. No bowel obstruction or bowel inflammation.  PERITONEUM: No ascites.  VESSELS: Atherosclerotic changes.  RETROPERITONEUM/LYMPH NODES: No lymphadenopathy.  ABDOMINAL WALL: Within normal limits.  BONES: Degenerative changes.    IMPRESSION:  No acute pathology.  Dilation of the intra and extra hepatic bile ducts likely representing   postcholecystectomy state. Correlate with LFTs.    Consultant(s) Notes Reviewed:      Care Discussed with Consultants/Other Providers:   72 year old lady, Albanian speaking h/o breast CA s/p B/L mastectomy, T2DM (Janumet/metformin/glimizide, HLD) p/w weakness and non specific GI complaints which have improved over the last two weeks. Patient states that for the last two weeks she has multijple episodes of N/V and diarrhea, though they have been improving. However, the patient has had decreased PO intake as a result of these symptoms. Last EGD/Colonoscopy was about a year ago and as per patient, was normal. She recently saw her PMD and was prescribed a course of H. Pylori treatment. Patient with some abdominal discomfort, however denies intractable pain or bloody stools. Upon arrival patient noted to have FS of 42. [place] ID [id number].     Son was also called [phone], he stated that he didn't know that the patient also takes metformin and stated that he didn't find another bottle in her room of metformin, however he also stated that the patient knows better than him and she may still be taking metformin?        PAST MEDICAL & SURGICAL HISTORY:  Diabetes      Breast CA      HLD (hyperlipidemia)      H/O bilateral mastectomy          Review of Systems:   CONSTITUTIONAL: No fever, weight loss, or fatigue  EYES: No eye pain, visual disturbances, or discharge  ENMT:  No difficulty hearing, tinnitus, vertigo; No sinus or throat pain  NECK: No pain or stiffness  BREASTS: No pain, masses, or nipple discharge  RESPIRATORY: No cough, wheezing, chills or hemoptysis; No shortness of breath  CARDIOVASCULAR: No chest pain, palpitations, dizziness, or leg swelling  GASTROINTESTINAL: As per HPI  GENITOURINARY: No dysuria, frequency, hematuria, or incontinence  NEUROLOGICAL: No headaches, memory loss, loss of strength, numbness, or tremors  SKIN: No itching, burning, rashes, or lesions   LYMPH NODES: No enlarged glands  ENDOCRINE: No heat or cold intolerance; No hair loss  MUSCULOSKELETAL: No joint pain or swelling; No muscle, back, or extremity pain  PSYCHIATRIC: No depression, anxiety, mood swings, or difficulty sleeping  HEME/LYMPH: No easy bruising, or bleeding gums  ALLERGY AND IMMUNOLOGIC: No hives or eczema    Allergies    No Known Allergies    Intolerances        Social History: Denies actively smoking, drinking or drug use    FAMILY HISTORY: Not pertinent family history      MEDICATIONS  (STANDING):  dextrose 5% + sodium chloride 0.9%. 1000 milliLiter(s) (100 mL/Hr) IV Continuous <Continuous>  dextrose 5%. 1000 milliLiter(s) (50 mL/Hr) IV Continuous <Continuous>  dextrose 5%. 1000 milliLiter(s) (100 mL/Hr) IV Continuous <Continuous>  dextrose 50% Injectable 25 Gram(s) IV Push once  dextrose 50% Injectable 12.5 Gram(s) IV Push once  dextrose 50% Injectable 25 Gram(s) IV Push once  glucagon  Injectable 1 milliGRAM(s) IntraMuscular once    MEDICATIONS  (PRN):  dextrose Oral Gel 15 Gram(s) Oral once PRN Blood Glucose LESS THAN 70 milliGRAM(s)/deciliter      T(C): 36.6 (23 @ 13:21), Max: 36.6 (23 @ 13:21)  HR: 86 (23 @ 13:21) (78 - 86)  BP: 122/99 (23 @ 13:21) (122/99 - 175/71)  RR: 16 (23 @ 13:21) (16 - 17)  SpO2: 100% (23 @ 13:21) (100% - 100%)    CAPILLARY BLOOD GLUCOSE      POCT Blood Glucose.: 92 mg/dL (2023 13:24)  POCT Blood Glucose.: 217 mg/dL (2023 11:21)  POCT Blood Glucose.: 82 mg/dL (2023 10:35)  POCT Blood Glucose.: 42 mg/dL (2023 10:08)  POCT Blood Glucose.: 53 mg/dL (2023 10:08)    I&O's Summary      PHYSICAL EXAM:  GENERAL: NAD, well-developed  HEAD:  Atraumatic, Normocephalic  EYES: EOMI, PERRLA, conjunctiva and sclera clear  NECK: Supple, No elevated JVD  CHEST/LUNG: Clear to auscultation bilaterally; No wheeze  HEART: Regular rate and rhythm; No murmurs, rubs, or gallops  ABDOMEN: Soft, Nontender, Nondistended; Bowel sounds present  EXTREMITIES:  2+ Peripheral Pulses, No clubbing, cyanosis, or edema  PSYCH: AAOx3  NEUROLOGY: CN II-XII grossly intact, moving all extremities  SKIN: No rashes or lesions    LABS:                        10.3   9.09  )-----------( 182      ( 2023 10:58 )             32.1     01-02    131<L>  |  95<L>  |  8   ----------------------------<  129<H>  4.0   |  22  |  0.54    Ca    8.9      2023 10:58  Phos  3.3       Mg     1.50         TPro  6.8  /  Alb  4.3  /  TBili  0.2  /  DBili  x   /  AST  20  /  ALT  13  /  AlkPhos  56            Urinalysis Basic - ( 2023 14:00 )    Color: Colorless / Appearance: Clear / S.013 / pH: x  Gluc: x / Ketone: Negative  / Bili: Negative / Urobili: <2 mg/dL   Blood: x / Protein: Negative / Nitrite: Negative   Leuk Esterase: Negative / RBC: x / WBC x   Sq Epi: x / Non Sq Epi: x / Bacteria: x          RADIOLOGY & ADDITIONAL TESTS:    ECG Personally Reviewed -     Imaging Personally Reviewed:    NTERPRETATION:  CLINICAL INFORMATION: 2 weeks of persistent GI symptoms,   nausea, vomiting, diarrhea evaluate for acute process.    COMPARISON: None.    CONTRAST/COMPLICATIONS:  IV Contrast: Omnipaque 350  90 cc administered   10 cc discarded  Oral Contrast: NONE  Complications: None reported at time of study completion    PROCEDURE:  CT of the Abdomen and Pelvis was performed.  Sagittal and coronal reformats were performed.    FINDINGS:  LOWER CHEST: Bibasilar atelectasis. Coronary artery and aortic   calcifications.    LIVER: Within normal limits.  BILE DUCTS: Dilation of the intra and extra hepatic bile ducts likely   representing postcholecystectomy state.  GALLBLADDER: Cholecystectomy.  SPLEEN: Within normal limits.  PANCREAS: Within normal limits.  ADRENALS: Within normal limits.  KIDNEYS/URETERS: Right-sided renal cyst with additional hypodense foci   too small to characterize.    BLADDER: Distended bladder.  REPRODUCTIVE ORGANS: Uterus and adnexa within normal limits.    BOWEL: Normal appendix. No bowel obstruction or bowel inflammation.  PERITONEUM: No ascites.  VESSELS: Atherosclerotic changes.  RETROPERITONEUM/LYMPH NODES: No lymphadenopathy.  ABDOMINAL WALL: Within normal limits.  BONES: Degenerative changes.    IMPRESSION:  No acute pathology.  Dilation of the intra and extra hepatic bile ducts likely representing   postcholecystectomy state. Correlate with LFTs.    Consultant(s) Notes Reviewed:      Care Discussed with Consultants/Other Providers:   72 year old lady, Azeri speaking h/o breast CA s/p B/L mastectomy, T2DM (Janumet/metformin/glimizide), HLD p/w weakness and non specific GI complaints which have improved over the last two weeks. Patient states that for the last two weeks she has multijple episodes of N/V and diarrhea, though they have been improving. However, the patient has had decreased PO intake as a result of these symptoms. Last EGD/Colonoscopy was about a year ago and as per patient, was normal. She recently saw her PMD and was prescribed a course of H. Pylori treatment. Patient with some abdominal discomfort, however denies intractable pain or bloody stools. Upon arrival patient noted to have FS of 42. [place] ID [id number].     Son was also called [phone], he stated that he didn't know that the patient also takes metformin and stated that he didn't find another bottle in her room of metformin, however he also stated that the patient knows better than him and she may still be taking metformin?        PAST MEDICAL & SURGICAL HISTORY:  Diabetes      Breast CA      HLD (hyperlipidemia)      H/O bilateral mastectomy          Review of Systems:   CONSTITUTIONAL: No fever, weight loss, or fatigue  EYES: No eye pain, visual disturbances, or discharge  ENMT:  No difficulty hearing, tinnitus, vertigo; No sinus or throat pain  NECK: No pain or stiffness  BREASTS: No pain, masses, or nipple discharge  RESPIRATORY: No cough, wheezing, chills or hemoptysis; No shortness of breath  CARDIOVASCULAR: No chest pain, palpitations, dizziness, or leg swelling  GASTROINTESTINAL: As per HPI  GENITOURINARY: No dysuria, frequency, hematuria, or incontinence  NEUROLOGICAL: No headaches, memory loss, loss of strength, numbness, or tremors  SKIN: No itching, burning, rashes, or lesions   LYMPH NODES: No enlarged glands  ENDOCRINE: No heat or cold intolerance; No hair loss  MUSCULOSKELETAL: No joint pain or swelling; No muscle, back, or extremity pain  PSYCHIATRIC: No depression, anxiety, mood swings, or difficulty sleeping  HEME/LYMPH: No easy bruising, or bleeding gums  ALLERGY AND IMMUNOLOGIC: No hives or eczema    Allergies    No Known Allergies    Intolerances        Social History: Denies actively smoking, drinking or drug use    FAMILY HISTORY: Not pertinent family history      MEDICATIONS  (STANDING):  dextrose 5% + sodium chloride 0.9%. 1000 milliLiter(s) (100 mL/Hr) IV Continuous <Continuous>  dextrose 5%. 1000 milliLiter(s) (50 mL/Hr) IV Continuous <Continuous>  dextrose 5%. 1000 milliLiter(s) (100 mL/Hr) IV Continuous <Continuous>  dextrose 50% Injectable 25 Gram(s) IV Push once  dextrose 50% Injectable 12.5 Gram(s) IV Push once  dextrose 50% Injectable 25 Gram(s) IV Push once  glucagon  Injectable 1 milliGRAM(s) IntraMuscular once    MEDICATIONS  (PRN):  dextrose Oral Gel 15 Gram(s) Oral once PRN Blood Glucose LESS THAN 70 milliGRAM(s)/deciliter      T(C): 36.6 (23 @ 13:21), Max: 36.6 (23 @ 13:21)  HR: 86 (23 @ 13:21) (78 - 86)  BP: 122/99 (23 @ 13:21) (122/99 - 175/71)  RR: 16 (23 @ 13:21) (16 - 17)  SpO2: 100% (23 @ 13:21) (100% - 100%)    CAPILLARY BLOOD GLUCOSE      POCT Blood Glucose.: 92 mg/dL (2023 13:24)  POCT Blood Glucose.: 217 mg/dL (2023 11:21)  POCT Blood Glucose.: 82 mg/dL (2023 10:35)  POCT Blood Glucose.: 42 mg/dL (2023 10:08)  POCT Blood Glucose.: 53 mg/dL (2023 10:08)    I&O's Summary      PHYSICAL EXAM:  GENERAL: NAD, well-developed  HEAD:  Atraumatic, Normocephalic  EYES: EOMI, PERRLA, conjunctiva and sclera clear  NECK: Supple, No elevated JVD  CHEST/LUNG: Clear to auscultation bilaterally; No wheeze  HEART: Regular rate and rhythm; No murmurs, rubs, or gallops  ABDOMEN: Soft, Nontender, Nondistended; Bowel sounds present  EXTREMITIES:  2+ Peripheral Pulses, No clubbing, cyanosis, or edema  PSYCH: AAOx3  NEUROLOGY: CN II-XII grossly intact, moving all extremities  SKIN: No rashes or lesions    LABS:                        10.3   9.09  )-----------( 182      ( 2023 10:58 )             32.1     01-02    131<L>  |  95<L>  |  8   ----------------------------<  129<H>  4.0   |  22  |  0.54    Ca    8.9      2023 10:58  Phos  3.3       Mg     1.50         TPro  6.8  /  Alb  4.3  /  TBili  0.2  /  DBili  x   /  AST  20  /  ALT  13  /  AlkPhos  56            Urinalysis Basic - ( 2023 14:00 )    Color: Colorless / Appearance: Clear / S.013 / pH: x  Gluc: x / Ketone: Negative  / Bili: Negative / Urobili: <2 mg/dL   Blood: x / Protein: Negative / Nitrite: Negative   Leuk Esterase: Negative / RBC: x / WBC x   Sq Epi: x / Non Sq Epi: x / Bacteria: x          RADIOLOGY & ADDITIONAL TESTS:    ECG Personally Reviewed -     Imaging Personally Reviewed:    NTERPRETATION:  CLINICAL INFORMATION: 2 weeks of persistent GI symptoms,   nausea, vomiting, diarrhea evaluate for acute process.    COMPARISON: None.    CONTRAST/COMPLICATIONS:  IV Contrast: Omnipaque 350  90 cc administered   10 cc discarded  Oral Contrast: NONE  Complications: None reported at time of study completion    PROCEDURE:  CT of the Abdomen and Pelvis was performed.  Sagittal and coronal reformats were performed.    FINDINGS:  LOWER CHEST: Bibasilar atelectasis. Coronary artery and aortic   calcifications.    LIVER: Within normal limits.  BILE DUCTS: Dilation of the intra and extra hepatic bile ducts likely   representing postcholecystectomy state.  GALLBLADDER: Cholecystectomy.  SPLEEN: Within normal limits.  PANCREAS: Within normal limits.  ADRENALS: Within normal limits.  KIDNEYS/URETERS: Right-sided renal cyst with additional hypodense foci   too small to characterize.    BLADDER: Distended bladder.  REPRODUCTIVE ORGANS: Uterus and adnexa within normal limits.    BOWEL: Normal appendix. No bowel obstruction or bowel inflammation.  PERITONEUM: No ascites.  VESSELS: Atherosclerotic changes.  RETROPERITONEUM/LYMPH NODES: No lymphadenopathy.  ABDOMINAL WALL: Within normal limits.  BONES: Degenerative changes.    IMPRESSION:  No acute pathology.  Dilation of the intra and extra hepatic bile ducts likely representing   postcholecystectomy state. Correlate with LFTs.    Consultant(s) Notes Reviewed:      Care Discussed with Consultants/Other Providers:

## 2023-01-02 NOTE — H&P ADULT - PROBLEM SELECTOR PLAN 5
Possibly type B, in the setting of polypharmacy as mentioned above  patient is on Janumet and Metfromin?

## 2023-01-03 LAB
ANION GAP SERPL CALC-SCNC: 8 MMOL/L — SIGNIFICANT CHANGE UP (ref 7–14)
BLOOD GAS VENOUS COMPREHENSIVE RESULT: SIGNIFICANT CHANGE UP
BUN SERPL-MCNC: 4 MG/DL — LOW (ref 7–23)
CALCIUM SERPL-MCNC: 9 MG/DL — SIGNIFICANT CHANGE UP (ref 8.4–10.5)
CHLORIDE SERPL-SCNC: 106 MMOL/L — SIGNIFICANT CHANGE UP (ref 98–107)
CO2 SERPL-SCNC: 26 MMOL/L — SIGNIFICANT CHANGE UP (ref 22–31)
CREAT SERPL-MCNC: 0.62 MG/DL — SIGNIFICANT CHANGE UP (ref 0.5–1.3)
CULTURE RESULTS: SIGNIFICANT CHANGE UP
EGFR: 96 ML/MIN/1.73M2 — SIGNIFICANT CHANGE UP
GLUCOSE BLDC GLUCOMTR-MCNC: 137 MG/DL — HIGH (ref 70–99)
GLUCOSE BLDC GLUCOMTR-MCNC: 186 MG/DL — HIGH (ref 70–99)
GLUCOSE BLDC GLUCOMTR-MCNC: 189 MG/DL — HIGH (ref 70–99)
GLUCOSE BLDC GLUCOMTR-MCNC: 279 MG/DL — HIGH (ref 70–99)
GLUCOSE SERPL-MCNC: 84 MG/DL — SIGNIFICANT CHANGE UP (ref 70–99)
HCT VFR BLD CALC: 36.9 % — SIGNIFICANT CHANGE UP (ref 34.5–45)
HGB BLD-MCNC: 11.6 G/DL — SIGNIFICANT CHANGE UP (ref 11.5–15.5)
MAGNESIUM SERPL-MCNC: 2.2 MG/DL — SIGNIFICANT CHANGE UP (ref 1.6–2.6)
MCHC RBC-ENTMCNC: 26.2 PG — LOW (ref 27–34)
MCHC RBC-ENTMCNC: 31.4 GM/DL — LOW (ref 32–36)
MCV RBC AUTO: 83.5 FL — SIGNIFICANT CHANGE UP (ref 80–100)
NRBC # BLD: 0 /100 WBCS — SIGNIFICANT CHANGE UP (ref 0–0)
NRBC # FLD: 0 K/UL — SIGNIFICANT CHANGE UP (ref 0–0)
PHOSPHATE SERPL-MCNC: 2.9 MG/DL — SIGNIFICANT CHANGE UP (ref 2.5–4.5)
PLATELET # BLD AUTO: 198 K/UL — SIGNIFICANT CHANGE UP (ref 150–400)
POTASSIUM SERPL-MCNC: 4.6 MMOL/L — SIGNIFICANT CHANGE UP (ref 3.5–5.3)
POTASSIUM SERPL-SCNC: 4.6 MMOL/L — SIGNIFICANT CHANGE UP (ref 3.5–5.3)
RBC # BLD: 4.42 M/UL — SIGNIFICANT CHANGE UP (ref 3.8–5.2)
RBC # FLD: 13.8 % — SIGNIFICANT CHANGE UP (ref 10.3–14.5)
SODIUM SERPL-SCNC: 140 MMOL/L — SIGNIFICANT CHANGE UP (ref 135–145)
SPECIMEN SOURCE: SIGNIFICANT CHANGE UP
WBC # BLD: 8.63 K/UL — SIGNIFICANT CHANGE UP (ref 3.8–10.5)
WBC # FLD AUTO: 8.63 K/UL — SIGNIFICANT CHANGE UP (ref 3.8–10.5)

## 2023-01-03 RX ORDER — SODIUM CHLORIDE 9 MG/ML
1000 INJECTION INTRAMUSCULAR; INTRAVENOUS; SUBCUTANEOUS
Refills: 0 | Status: DISCONTINUED | OUTPATIENT
Start: 2023-01-03 | End: 2023-01-04

## 2023-01-03 RX ADMIN — Medication 650 MILLIGRAM(S): at 03:20

## 2023-01-03 RX ADMIN — Medication 1: at 17:20

## 2023-01-03 RX ADMIN — SIMVASTATIN 20 MILLIGRAM(S): 20 TABLET, FILM COATED ORAL at 22:40

## 2023-01-03 RX ADMIN — Medication 650 MILLIGRAM(S): at 10:10

## 2023-01-03 RX ADMIN — Medication 650 MILLIGRAM(S): at 19:37

## 2023-01-03 RX ADMIN — ENOXAPARIN SODIUM 40 MILLIGRAM(S): 100 INJECTION SUBCUTANEOUS at 11:48

## 2023-01-03 RX ADMIN — Medication 650 MILLIGRAM(S): at 04:30

## 2023-01-03 RX ADMIN — Medication 3: at 11:48

## 2023-01-03 RX ADMIN — Medication 650 MILLIGRAM(S): at 11:10

## 2023-01-03 RX ADMIN — SODIUM CHLORIDE 100 MILLILITER(S): 9 INJECTION, SOLUTION INTRAVENOUS at 10:10

## 2023-01-03 RX ADMIN — GABAPENTIN 300 MILLIGRAM(S): 400 CAPSULE ORAL at 22:40

## 2023-01-03 RX ADMIN — Medication 3 MILLIGRAM(S): at 22:41

## 2023-01-03 NOTE — PATIENT PROFILE ADULT - FALL HARM RISK - HARM RISK INTERVENTIONS
Assistance with ambulation/Communicate Risk of Fall with Harm to all staff/Monitor for mental status changes/Monitor gait and stability/Reinforce activity limits and safety measures with patient and family/Tailored Fall Risk Interventions/Visual Cue: Yellow wristband and red socks/Bed in lowest position, wheels locked, appropriate side rails in place/Call bell, personal items and telephone in reach/Instruct patient to call for assistance before getting out of bed or chair/Non-slip footwear when patient is out of bed/[place] to call system/Physically safe environment - no spills, clutter or unnecessary equipment/Purposeful Proactive Rounding/Room/bathroom lighting operational, light cord in reach

## 2023-01-03 NOTE — PROGRESS NOTE ADULT - SUBJECTIVE AND OBJECTIVE BOX
Patient is a 72y old  Female who presents with a chief complaint of abdominal discomfort/diarrhea and weakness (2023 16:43)    DATE OF SERVICE: 23 @ 10:56    SUBJECTIVE / OVERNIGHT EVENTS: overnight events noted    ROS:  Resp: No cough no sputum production  CVS: No chest pain no palpitations no orthopnea  GI: no N/V/D  per RN      MEDICATIONS  (STANDING):  dextrose 5% + sodium chloride 0.9%. 1000 milliLiter(s) (100 mL/Hr) IV Continuous <Continuous>  dextrose 5%. 1000 milliLiter(s) (50 mL/Hr) IV Continuous <Continuous>  dextrose 5%. 1000 milliLiter(s) (100 mL/Hr) IV Continuous <Continuous>  dextrose 50% Injectable 25 Gram(s) IV Push once  dextrose 50% Injectable 12.5 Gram(s) IV Push once  dextrose 50% Injectable 25 Gram(s) IV Push once  enoxaparin Injectable 40 milliGRAM(s) SubCutaneous every 24 hours  gabapentin 300 milliGRAM(s) Oral at bedtime  glucagon  Injectable 1 milliGRAM(s) IntraMuscular once  insulin lispro (ADMELOG) corrective regimen sliding scale   SubCutaneous three times a day before meals  simvastatin 20 milliGRAM(s) Oral at bedtime    MEDICATIONS  (PRN):  acetaminophen     Tablet .. 650 milliGRAM(s) Oral every 6 hours PRN Temp greater or equal to 38C (100.4F), Mild Pain (1 - 3)  aluminum hydroxide/magnesium hydroxide/simethicone Suspension 30 milliLiter(s) Oral every 4 hours PRN Dyspepsia  dextrose Oral Gel 15 Gram(s) Oral once PRN Blood Glucose LESS THAN 70 milliGRAM(s)/deciliter  melatonin 3 milliGRAM(s) Oral at bedtime PRN Insomnia  ondansetron Injectable 4 milliGRAM(s) IV Push every 8 hours PRN Nausea and/or Vomiting        CAPILLARY BLOOD GLUCOSE      POCT Blood Glucose.: 137 mg/dL (2023 08:00)  POCT Blood Glucose.: 275 mg/dL (2023 22:44)  POCT Blood Glucose.: 206 mg/dL (2023 17:53)  POCT Blood Glucose.: 92 mg/dL (2023 13:24)  POCT Blood Glucose.: 217 mg/dL (2023 11:21)    I&O's Summary      Vital Signs Last 24 Hrs  T(C): 36.7 (2023 09:53), Max: 37 (2023 02:18)  T(F): 98 (2023 09:53), Max: 98.6 (2023 02:18)  HR: 97 (2023 09:53) (80 - 102)  BP: 100/62 (2023 09:53) (100/62 - 145/73)  BP(mean): --  RR: 19 (2023 09:53) (16 - 19)  SpO2: 97% (2023 09:53) (97% - 100%)    PHYSICAL EXAM:  GENERAL: in no apparent distress  HEAD:  Atraumatic, Normocephalic  EYES: EOMI, PERRLA, sclera clear  NECK: Supple, No JVD  CHEST/LUNG: clear b/l, no wheeze  HEART: S1 S2; ejection systolic murmur +   ABDOMEN: Soft, Nontender  EXTREMITIES:   no edema  NEUROLOGY: AO x 3 non-focal  SKIN: No rashes or lesions    LABS:                        11.6   8.63  )-----------( 198      ( 2023 03:31 )             36.9     01-03    140  |  106  |  4<L>  ----------------------------<  84  4.6   |  26  |  0.62    Ca    9.0      2023 03:31  Phos  2.9     01-03  Mg     2.20     01-03    TPro  6.8  /  Alb  4.3  /  TBili  0.2  /  DBili  x   /  AST  20  /  ALT  13  /  AlkPhos  56  01-02          Urinalysis Basic - ( 2023 14:00 )    Color: Colorless / Appearance: Clear / S.013 / pH: x  Gluc: x / Ketone: Negative  / Bili: Negative / Urobili: <2 mg/dL   Blood: x / Protein: Negative / Nitrite: Negative   Leuk Esterase: Negative / RBC: x / WBC x   Sq Epi: x / Non Sq Epi: x / Bacteria: x          All consultant(s) notes reviewed and care discussed with other providers        Contact Number, Dr Lizama [phone] Patient is a 72y old  Female who presents with a chief complaint of abdominal discomfort/diarrhea and weakness (2023 16:43)  patient not known to me, assigned this AM to assume care  chart reviewed and events thus far noted   admitted overnight by hospitalist colleague     DATE OF SERVICE: 23 @ 10:56    SUBJECTIVE / OVERNIGHT EVENTS: overnight events noted    ROS:  Resp: No cough no sputum production  CVS: No chest pain no palpitations no orthopnea  GI: no N/V/D  per RN      MEDICATIONS  (STANDING):  dextrose 5% + sodium chloride 0.9%. 1000 milliLiter(s) (100 mL/Hr) IV Continuous <Continuous>  dextrose 5%. 1000 milliLiter(s) (50 mL/Hr) IV Continuous <Continuous>  dextrose 5%. 1000 milliLiter(s) (100 mL/Hr) IV Continuous <Continuous>  dextrose 50% Injectable 25 Gram(s) IV Push once  dextrose 50% Injectable 12.5 Gram(s) IV Push once  dextrose 50% Injectable 25 Gram(s) IV Push once  enoxaparin Injectable 40 milliGRAM(s) SubCutaneous every 24 hours  gabapentin 300 milliGRAM(s) Oral at bedtime  glucagon  Injectable 1 milliGRAM(s) IntraMuscular once  insulin lispro (ADMELOG) corrective regimen sliding scale   SubCutaneous three times a day before meals  simvastatin 20 milliGRAM(s) Oral at bedtime    MEDICATIONS  (PRN):  acetaminophen     Tablet .. 650 milliGRAM(s) Oral every 6 hours PRN Temp greater or equal to 38C (100.4F), Mild Pain (1 - 3)  aluminum hydroxide/magnesium hydroxide/simethicone Suspension 30 milliLiter(s) Oral every 4 hours PRN Dyspepsia  dextrose Oral Gel 15 Gram(s) Oral once PRN Blood Glucose LESS THAN 70 milliGRAM(s)/deciliter  melatonin 3 milliGRAM(s) Oral at bedtime PRN Insomnia  ondansetron Injectable 4 milliGRAM(s) IV Push every 8 hours PRN Nausea and/or Vomiting        CAPILLARY BLOOD GLUCOSE      POCT Blood Glucose.: 137 mg/dL (2023 08:00)  POCT Blood Glucose.: 275 mg/dL (2023 22:44)  POCT Blood Glucose.: 206 mg/dL (2023 17:53)  POCT Blood Glucose.: 92 mg/dL (2023 13:24)  POCT Blood Glucose.: 217 mg/dL (2023 11:21)    I&O's Summary      Vital Signs Last 24 Hrs  T(C): 36.7 (2023 09:53), Max: 37 (2023 02:18)  T(F): 98 (2023 09:53), Max: 98.6 (2023 02:18)  HR: 97 (2023 09:53) (80 - 102)  BP: 100/62 (2023 09:53) (100/62 - 145/73)  BP(mean): --  RR: 19 (2023 09:53) (16 - 19)  SpO2: 97% (2023 09:53) (97% - 100%)    PHYSICAL EXAM:  GENERAL: in no apparent distress  HEAD:  Atraumatic, Normocephalic  EYES: EOMI, PERRLA, sclera clear  NECK: Supple, No JVD  CHEST/LUNG: clear b/l, no wheeze  HEART: S1 S2; ejection systolic murmur +   ABDOMEN: Soft, Nontender  EXTREMITIES:   no edema  NEUROLOGY: AO x 3 non-focal  SKIN: No rashes or lesions    LABS:                        11.6   8.63  )-----------( 198      ( 2023 03:31 )             36.9     01-03    140  |  106  |  4<L>  ----------------------------<  84  4.6   |  26  |  0.62    Ca    9.0      2023 03:31  Phos  2.9     01-03  Mg     2.20     01-03    TPro  6.8  /  Alb  4.3  /  TBili  0.2  /  DBili  x   /  AST  20  /  ALT  13  /  AlkPhos  56  01-02          Urinalysis Basic - ( 2023 14:00 )    Color: Colorless / Appearance: Clear / S.013 / pH: x  Gluc: x / Ketone: Negative  / Bili: Negative / Urobili: <2 mg/dL   Blood: x / Protein: Negative / Nitrite: Negative   Leuk Esterase: Negative / RBC: x / WBC x   Sq Epi: x / Non Sq Epi: x / Bacteria: x          All consultant(s) notes reviewed and care discussed with other providers        Contact Number, Dr Lizama [phone]

## 2023-01-04 ENCOUNTER — TRANSCRIPTION ENCOUNTER (OUTPATIENT)
Age: 73
End: 2023-01-04

## 2023-01-04 VITALS
DIASTOLIC BLOOD PRESSURE: 70 MMHG | SYSTOLIC BLOOD PRESSURE: 143 MMHG | TEMPERATURE: 98 F | OXYGEN SATURATION: 100 % | HEART RATE: 95 BPM | RESPIRATION RATE: 18 BRPM

## 2023-01-04 LAB
ANION GAP SERPL CALC-SCNC: 10 MMOL/L — SIGNIFICANT CHANGE UP (ref 7–14)
BLOOD GAS VENOUS COMPREHENSIVE RESULT: SIGNIFICANT CHANGE UP
BUN SERPL-MCNC: 4 MG/DL — LOW (ref 7–23)
CALCIUM SERPL-MCNC: 8.6 MG/DL — SIGNIFICANT CHANGE UP (ref 8.4–10.5)
CHLORIDE SERPL-SCNC: 106 MMOL/L — SIGNIFICANT CHANGE UP (ref 98–107)
CO2 SERPL-SCNC: 24 MMOL/L — SIGNIFICANT CHANGE UP (ref 22–31)
CREAT SERPL-MCNC: 0.61 MG/DL — SIGNIFICANT CHANGE UP (ref 0.5–1.3)
EGFR: 96 ML/MIN/1.73M2 — SIGNIFICANT CHANGE UP
GLUCOSE BLDC GLUCOMTR-MCNC: 133 MG/DL — HIGH (ref 70–99)
GLUCOSE BLDC GLUCOMTR-MCNC: 261 MG/DL — HIGH (ref 70–99)
GLUCOSE SERPL-MCNC: 158 MG/DL — HIGH (ref 70–99)
HCT VFR BLD CALC: 34.4 % — LOW (ref 34.5–45)
HGB BLD-MCNC: 11 G/DL — LOW (ref 11.5–15.5)
MAGNESIUM SERPL-MCNC: 1.6 MG/DL — SIGNIFICANT CHANGE UP (ref 1.6–2.6)
MCHC RBC-ENTMCNC: 26.9 PG — LOW (ref 27–34)
MCHC RBC-ENTMCNC: 32 GM/DL — SIGNIFICANT CHANGE UP (ref 32–36)
MCV RBC AUTO: 84.1 FL — SIGNIFICANT CHANGE UP (ref 80–100)
NRBC # BLD: 0 /100 WBCS — SIGNIFICANT CHANGE UP (ref 0–0)
NRBC # FLD: 0 K/UL — SIGNIFICANT CHANGE UP (ref 0–0)
PHOSPHATE SERPL-MCNC: 3 MG/DL — SIGNIFICANT CHANGE UP (ref 2.5–4.5)
PLATELET # BLD AUTO: 180 K/UL — SIGNIFICANT CHANGE UP (ref 150–400)
POTASSIUM SERPL-MCNC: 4.2 MMOL/L — SIGNIFICANT CHANGE UP (ref 3.5–5.3)
POTASSIUM SERPL-SCNC: 4.2 MMOL/L — SIGNIFICANT CHANGE UP (ref 3.5–5.3)
RBC # BLD: 4.09 M/UL — SIGNIFICANT CHANGE UP (ref 3.8–5.2)
RBC # FLD: 14 % — SIGNIFICANT CHANGE UP (ref 10.3–14.5)
SODIUM SERPL-SCNC: 140 MMOL/L — SIGNIFICANT CHANGE UP (ref 135–145)
WBC # BLD: 5.76 K/UL — SIGNIFICANT CHANGE UP (ref 3.8–10.5)
WBC # FLD AUTO: 5.76 K/UL — SIGNIFICANT CHANGE UP (ref 3.8–10.5)

## 2023-01-04 RX ADMIN — ENOXAPARIN SODIUM 40 MILLIGRAM(S): 100 INJECTION SUBCUTANEOUS at 11:39

## 2023-01-04 RX ADMIN — Medication 3: at 11:40

## 2023-01-04 NOTE — DISCHARGE NOTE PROVIDER - PROVIDER TOKENS
FREE:[LAST:[ORTHOPEDIC CLINIC],PHONE:[phone]],FAX:[(   )    -],ADDRESS:[[address] [place] [address]],SCHEDULEDAPPT:[01/11/2023],SCHEDULEDAPPTTIME:[09:00 AM]]

## 2023-01-04 NOTE — PHYSICAL THERAPY INITIAL EVALUATION ADULT - PATIENT PROFILE REVIEW, REHAB EVAL
PT evaluate and treat orders received: Ambulate with assistance. Consulted with RN Sarabjit MARY, pt cleared to participate in PT evaluation./yes

## 2023-01-04 NOTE — DISCHARGE NOTE NURSING/CASE MANAGEMENT/SOCIAL WORK - NSDCPEFALRISK_GEN_ALL_CORE
For information on Fall & Injury Prevention, visit: [email or web address] OR  [email or web address] OR  https://www.cdc.gov/steadi/patient.html

## 2023-01-04 NOTE — DISCHARGE NOTE PROVIDER - DISCHARGE SERVICE FOR PATIENT
Cara Asa 1/6/1931 
[id number] Situation: 
Verbal report received from: [facility] Procedure: Procedure(s): ESOPHAGOGASTRODUODENOSCOPY (EGD) ESOPHAGOGASTRODUODENAL (EGD) BIOPSY Background: 
 
Preoperative diagnosis: melena, gi bleed Postoperative diagnosis: Candida esophagitis, duodenitis, duodenal ulcer :  Dr. Patricia Senior Assistant(s): Endoscopy Technician-1: Husam Gan Endoscopy RN-1: Genny Haywood Specimens:  
ID Type Source Tests Collected by Time Destination 1 : Gastric BX Preservative Gastric  Elisha Sarmiento MD 11/5/2021 8019 Pathology H. Pylori  no Assessment: 
Intra-procedure medications Anesthesia gave intra-procedure sedation and medications, see anesthesia flow sheet yes Intravenous fluids: NS@ Buren Marley Vital signs stable Abdominal assessment: round and soft Recommendation: 
Discharge patient per MD order. Return to floor Family or Friend Permission to share finding with family or friend yes on the discharge service for the patient. I have reviewed and made amendments to the documentation where necessary.

## 2023-01-04 NOTE — DISCHARGE NOTE PROVIDER - CARE PROVIDER_API CALL
[facility]TH[facility]EDIC CLINIC,   [address] [place] [address] Dr  [address] [place] [address]  Phone: [phone]  Fax: (   )    -  Scheduled Appointment: 01/11/2023 09:00 AM

## 2023-01-04 NOTE — PHYSICAL THERAPY INITIAL EVALUATION ADULT - PERTINENT HX OF CURRENT PROBLEM, REHAB EVAL
Pt is a 72 year old male presenting with weakness and non-specific GI complaints. CT A/P appears mostly unremarkable. Found with lactic acidosis on admission, though improved on subsequent draws. Pt also with hyponatremia possibly secondary to volume depletion. PMH listed below.

## 2023-01-04 NOTE — DISCHARGE NOTE PROVIDER - NSDCFUSCHEDAPPT_GEN_ALL_CORE_FT
[facility] Physician [facility]  ORTHOSURG [address]   Scheduled Appointment: 01/11/2023

## 2023-01-04 NOTE — PHYSICAL THERAPY INITIAL EVALUATION ADULT - GENERAL OBSERVATIONS, REHAB EVAL
Pt received seated in restroom, +heplock, in NAD. Son at bedside. Pt left semisupine in bed, pillow under knees, all lines intact and RN aware.

## 2023-01-04 NOTE — PROGRESS NOTE ADULT - ASSESSMENT
72 year old lady h/o breast CA s/p B/L mastectomy, T2DM (Janumet/metformin/glimizide, HLD) p/w weakness and non specific GI complaints which have improved over the last two weeks. CT A/P appears mostly unremarkable, patient w/lactic acidosis on admission, though improving on subsequent draws. Also with hyponatremia, possibly 2/2 volume depletion. 
72 year old lady h/o breast CA s/p B/L mastectomy, T2DM (Janumet/metformin/glimizide, HLD) p/w weakness and non specific GI complaints which have improved over the last two weeks. CT A/P appears mostly unremarkable, patient w/lactic acidosis on admission, though improving on subsequent draws. Also with hyponatremia, possibly 2/2 volume depletion.

## 2023-01-04 NOTE — PROGRESS NOTE ADULT - PROBLEM SELECTOR PLAN 2
resolving  likely secondary to hypoglycemia on admission
resolving  likely secondary to hypoglycemia on admission

## 2023-01-04 NOTE — PHYSICAL THERAPY INITIAL EVALUATION ADULT - FOLLOWS COMMANDS/ANSWERS QUESTIONS, REHAB EVAL
Amharic speaking, pt prefers Son to translate./75% of the time/able to follow single-step instructions

## 2023-01-04 NOTE — PROGRESS NOTE ADULT - PROBLEM SELECTOR PLAN 5
likely secondary to metformin polypharmacy  will continue to monitor  now resolved
likely secondary to metformin polypharmacy  will continue to monitor  now resolved

## 2023-01-04 NOTE — DISCHARGE NOTE PROVIDER - NSDCMRMEDTOKEN_GEN_ALL_CORE_FT
amoxicillin 500 mg oral capsule: 1 cap(s) orally 3 times a day  clarithromycin 500 mg oral tablet: 1 tab(s) orally every 12 hours  gabapentin 300 mg oral capsule: 300 milligram(s) orally once a day (at bedtime)  Janumet 50 mg-1000 mg oral tablet: 1 tab(s) orally 2 times a day  omeprazole 40 mg oral powder for reconstitution: 40 milligram(s) orally 2 times a day  simvastatin 20 mg oral tablet: 1 tab(s) orally once a day (at bedtime)

## 2023-01-04 NOTE — DISCHARGE NOTE PROVIDER - NSDCCPCAREPLAN_GEN_ALL_CORE_FT
PRINCIPAL DISCHARGE DIAGNOSIS  Diagnosis: Hypoglycemia  Assessment and Plan of Treatment: stop glipizide      SECONDARY DISCHARGE DIAGNOSES  Diagnosis: Watery diarrhea  Assessment and Plan of Treatment: call your doctor if recurs    Diagnosis: Hyponatremia  Assessment and Plan of Treatment:     Diagnosis: Lactic acidosis  Assessment and Plan of Treatment:     Diagnosis: T2DM (type 2 diabetes mellitus)  Assessment and Plan of Treatment:      PRINCIPAL DISCHARGE DIAGNOSIS  Diagnosis: Hypoglycemia  Assessment and Plan of Treatment: You came to the hospital with low blood sugar. This was likely caused by glopizide. Please STOP this medication on discharge. Follow-up with your primary care doctor within 1-2 weeks of discharge.        SECONDARY DISCHARGE DIAGNOSES  Diagnosis: Watery diarrhea  Assessment and Plan of Treatment: You had a CT scan which waqs unremarkable. Your stool studies were negative for an infection. You were given IV fluids and you improved. Call your doctor if recurs.    Diagnosis: T2DM (type 2 diabetes mellitus)  Assessment and Plan of Treatment: Continue consistent carbohydrate diet.  Monitor blood glucose levels throughout the day before meals and at bedtime.  Record blood sugars and bring to outpatient providers appointment in order to be reviewed by your doctor for management modifications.  Be aware of diabetes management symptoms including feeling cool and clammy may be related to low glucose levels.  Feeling hot and dry may indicate high glucose levels.  If  you feel these symptoms, check your blood sugar.  Make regular podiatry appointments in order to have feet checked for wounds and toe nails cut by a doctor to prevent infections.

## 2023-01-04 NOTE — PROGRESS NOTE ADULT - SUBJECTIVE AND OBJECTIVE BOX
Patient is a 72y old  Female who presents with a chief complaint of abdominal discomfort/diarrhea and weakness (2023 10:56)      DATE OF SERVICE: 23 @ 09:57    SUBJECTIVE / OVERNIGHT EVENTS: overnight events noted    ROS:  Resp: No cough no sputum production  CVS: No chest pain no palpitations no orthopnea  GI: no N/V/D  : no dysuria, no hematuria  Neuro: no weakness no paresthesias  Heme: No petechiae no easy bruising  Msk: chronic bilateral knee joint pain no swelling  Skin: No rash no itching        MEDICATIONS  (STANDING):  dextrose 5%. 1000 milliLiter(s) (50 mL/Hr) IV Continuous <Continuous>  dextrose 5%. 1000 milliLiter(s) (100 mL/Hr) IV Continuous <Continuous>  dextrose 50% Injectable 25 Gram(s) IV Push once  dextrose 50% Injectable 12.5 Gram(s) IV Push once  dextrose 50% Injectable 25 Gram(s) IV Push once  enoxaparin Injectable 40 milliGRAM(s) SubCutaneous every 24 hours  gabapentin 300 milliGRAM(s) Oral at bedtime  glucagon  Injectable 1 milliGRAM(s) IntraMuscular once  insulin lispro (ADMELOG) corrective regimen sliding scale   SubCutaneous three times a day before meals  simvastatin 20 milliGRAM(s) Oral at bedtime    MEDICATIONS  (PRN):  acetaminophen     Tablet .. 650 milliGRAM(s) Oral every 6 hours PRN Temp greater or equal to 38C (100.4F), Mild Pain (1 - 3)  aluminum hydroxide/magnesium hydroxide/simethicone Suspension 30 milliLiter(s) Oral every 4 hours PRN Dyspepsia  dextrose Oral Gel 15 Gram(s) Oral once PRN Blood Glucose LESS THAN 70 milliGRAM(s)/deciliter  melatonin 3 milliGRAM(s) Oral at bedtime PRN Insomnia  ondansetron Injectable 4 milliGRAM(s) IV Push every 8 hours PRN Nausea and/or Vomiting        CAPILLARY BLOOD GLUCOSE      POCT Blood Glucose.: 133 mg/dL (2023 07:30)  POCT Blood Glucose.: 186 mg/dL (2023 22:33)  POCT Blood Glucose.: 189 mg/dL (2023 16:46)  POCT Blood Glucose.: 279 mg/dL (2023 11:26)    I&O's Summary    2023 07:01  -  2023 07:00  --------------------------------------------------------  IN: 0 mL / OUT: 800 mL / NET: -800 mL        Vital Signs Last 24 Hrs  T(C): 37 (2023 06:18), Max: 37.2 (2023 18:01)  T(F): 98.6 (2023 06:18), Max: 98.9 (2023 18:01)  HR: 85 (2023 06:18) (85 - 99)  BP: 144/55 (2023 06:18) (136/68 - 166/58)  BP(mean): --  RR: 18 (2023 06:18) (18 - 19)  SpO2: 100% (2023 06:18) (98% - 100%)    PHYSICAL EXAM:  GENERAL: in no apparent distress  HEAD:  Atraumatic, Normocephalic  EYES: EOMI, PERRLA, sclera clear  NECK: Supple, No JVD  CHEST/LUNG: clear  HEART: S1 S2; ejection systolic murmur +   ABDOMEN: Soft, Nontender  EXTREMITIES:   no edema  NEUROLOGY: AO x 3 non-focal  SKIN: No rashes or lesions    LABS:                        11.0   5.76  )-----------( 180      ( 2023 06:42 )             34.4     01-04    140  |  106  |  4<L>  ----------------------------<  158<H>  4.2   |  24  |  0.61    Ca    8.6      2023 06:42  Phos  3.0     01-04  Mg     1.60     01-04    TPro  6.8  /  Alb  4.3  /  TBili  0.2  /  DBili  x   /  AST  20  /  ALT  13  /  AlkPhos  56  01-02          Urinalysis Basic - ( 2023 14:00 )    Color: Colorless / Appearance: Clear / S.013 / pH: x  Gluc: x / Ketone: Negative  / Bili: Negative / Urobili: <2 mg/dL   Blood: x / Protein: Negative / Nitrite: Negative   Leuk Esterase: Negative / RBC: x / WBC x   Sq Epi: x / Non Sq Epi: x / Bacteria: x          All consultant(s) notes reviewed and care discussed with other providers        Contact Number, Dr Lizama [phone]

## 2023-01-04 NOTE — PROGRESS NOTE ADULT - PROBLEM SELECTOR PLAN 3
continue finger sticks   finger sticks now > 100  continue diet  likely discontinue Glipizide on discharge
continue finger sticks   finger sticks now > 100  continue diet  likely discontinue Glipizide on discharge

## 2023-01-04 NOTE — DISCHARGE NOTE PROVIDER - HOSPITAL COURSE
72 year old lady h/o breast CA s/p B/L mastectomy, T2DM (Janumet/metformin/glimizide, HLD) p/w weakness and non specific GI complaints which have improved over the last two weeks. CT A/P appears mostly unremarkable, patient w/lactic acidosis on admission, though improving on subsequent draws. Also with hyponatremia, possibly 2/2 volume depletion.      Problem/Plan - 1:  ·  Problem: Watery diarrhea.   ·  Plan: all work up negative   will continue to monitor   likely medication induced   continue to monitor off metformin.     Problem/Plan - 2:  ·  Problem: General weakness.   ·  Plan: resolving  likely secondary to hypoglycemia on admission.     Problem/Plan - 3:  ·  Problem: T2DM (type 2 diabetes mellitus).   ·  Plan: continue finger sticks   finger sticks now > 100  continue diet  likely discontinue Glipizide on discharge.     Problem/Plan - 4:  ·  Problem: Hypoglycemia.   ·  Plan: resolved.     Problem/Plan - 5:  ·  Problem: Lactic acidosis.   ·  Plan: likely secondary to metformin polypharmacy  will continue to monitor  now resolved.     Problem/Plan - 6:  ·  Problem: Hyponatremia.   ·  Plan: resolved  no intervention at this time 72 year old lady h/o breast CA s/p B/L mastectomy, T2DM (Janumet/metformin/glimizide, HLD) p/w weakness and non specific GI complaints which have improved over the last two weeks. CT A/P appears mostly unremarkable, patient w/lactic acidosis on admission, though improved on subsequent draws. Also with hyponatremia, possibly 2/2 volume depletion.     Watery diarrhea.   stool studies and CT abd negative  will continue to monitor   likely medication induced   continue to monitor off metformin.    General weakness.   resolving  likely secondary to hypoglycemia on admission.    T2DM (type 2 diabetes mellitus).   continue finger sticks   finger sticks now > 100  continue diet  discontinue Glipizide on discharge.    Hypoglycemia.   resolved.    Lactic acidosis.   likely secondary to metformin polypharmacy  will continue to monitor  now resolved.    Hyponatremia.   resolved  no intervention at this time     Patient is medically cleared for discharge on 01/04/22. Discussed with Dr. Lizama.

## 2023-01-04 NOTE — PHYSICAL THERAPY INITIAL EVALUATION ADULT - ADDITIONAL COMMENTS
Pt lives in a private house with 6 exterior steps to enter, + 14 steps within home. Pt mostly homebound, ambulates with no device or rolling walker (pending level of knee discomfort). Daughter in law is CDPAP 25 hours/wk and assists with ADLs.

## 2023-01-04 NOTE — DISCHARGE NOTE NURSING/CASE MANAGEMENT/SOCIAL WORK - PATIENT PORTAL LINK FT
You can access the FollowMyHealth Patient Portal offered by [facility] by registering at the following website: [email or web address]. By joining [facility]’s FollowMyHealth portal, you will also be able to view your health information using other applications (apps) compatible with our system.

## 2023-01-04 NOTE — PROGRESS NOTE ADULT - PROBLEM SELECTOR PLAN 1
all work up negative   will continue to monitor   likely medication induced   continue to monitor off metformin
resolving  no BM overnight  will continue to monitor   likely medication induced   continue to monitor off metformin

## 2023-01-11 ENCOUNTER — APPOINTMENT (OUTPATIENT)
Dept: ORTHOPEDIC SURGERY | Facility: HOSPITAL | Age: 73
End: 2023-01-11

## 2023-01-11 ENCOUNTER — RESULT REVIEW (OUTPATIENT)
Age: 73
End: 2023-01-11

## 2023-01-11 ENCOUNTER — OUTPATIENT (OUTPATIENT)
Dept: OUTPATIENT SERVICES | Facility: HOSPITAL | Age: 73
LOS: 1 days | End: 2023-01-11
Payer: MEDICAID

## 2023-01-11 VITALS
WEIGHT: 135 LBS | TEMPERATURE: 97.5 F | HEIGHT: 63 IN | HEART RATE: 98 BPM | SYSTOLIC BLOOD PRESSURE: 133 MMHG | BODY MASS INDEX: 23.92 KG/M2 | DIASTOLIC BLOOD PRESSURE: 70 MMHG | RESPIRATION RATE: 18 BRPM

## 2023-01-11 DIAGNOSIS — Z98.890 OTHER SPECIFIED POSTPROCEDURAL STATES: Chronic | ICD-10-CM

## 2023-01-11 DIAGNOSIS — M17.11 UNILATERAL PRIMARY OSTEOARTHRITIS, RIGHT KNEE: ICD-10-CM

## 2023-01-11 DIAGNOSIS — M79.609 PAIN IN UNSPECIFIED LIMB: ICD-10-CM

## 2023-01-11 PROCEDURE — 73564 X-RAY EXAM KNEE 4 OR MORE: CPT

## 2023-01-11 PROCEDURE — G0463: CPT

## 2023-01-11 PROCEDURE — 73564 X-RAY EXAM KNEE 4 OR MORE: CPT | Mod: 26,50

## 2023-01-11 RX ORDER — SITAGLIPTIN AND METFORMIN HYDROCHLORIDE 50; 1000 MG/1; MG/1
50-1000 TABLET, FILM COATED ORAL DAILY
Refills: 0 | Status: ACTIVE | COMMUNITY
Start: 2023-01-11

## 2023-01-11 RX ORDER — AMOXICILLIN 500 MG/1
500 CAPSULE ORAL
Refills: 0 | Status: ACTIVE | COMMUNITY
Start: 2023-01-11

## 2023-01-11 RX ORDER — SIMVASTATIN 20 MG/1
20 TABLET, FILM COATED ORAL DAILY
Refills: 0 | Status: ACTIVE | COMMUNITY
Start: 2023-01-11

## 2023-01-11 RX ORDER — GABAPENTIN 300 MG/1
300 CAPSULE ORAL
Refills: 0 | Status: ACTIVE | COMMUNITY
Start: 2023-01-11

## 2023-01-11 RX ORDER — OMEPRAZOLE 20 MG/1
20 CAPSULE, DELAYED RELEASE ORAL TWICE DAILY
Refills: 0 | Status: ACTIVE | COMMUNITY
Start: 2023-01-11

## 2023-04-05 ENCOUNTER — OUTPATIENT (OUTPATIENT)
Dept: OUTPATIENT SERVICES | Facility: HOSPITAL | Age: 73
LOS: 1 days | End: 2023-04-05
Payer: MEDICAID

## 2023-04-05 ENCOUNTER — APPOINTMENT (OUTPATIENT)
Dept: ORTHOPEDIC SURGERY | Facility: HOSPITAL | Age: 73
End: 2023-04-05

## 2023-04-05 VITALS
DIASTOLIC BLOOD PRESSURE: 79 MMHG | TEMPERATURE: 98 F | HEART RATE: 86 BPM | WEIGHT: 117 LBS | HEIGHT: 63 IN | SYSTOLIC BLOOD PRESSURE: 127 MMHG | RESPIRATION RATE: 14 BRPM | BODY MASS INDEX: 20.73 KG/M2

## 2023-04-05 DIAGNOSIS — M17.0 BILATERAL PRIMARY OSTEOARTHRITIS OF KNEE: ICD-10-CM

## 2023-04-05 DIAGNOSIS — Z98.890 OTHER SPECIFIED POSTPROCEDURAL STATES: Chronic | ICD-10-CM

## 2023-04-05 DIAGNOSIS — M79.9 SOFT TISSUE DISORDER, UNSPECIFIED: ICD-10-CM

## 2023-04-05 PROCEDURE — G0463: CPT

## 2023-04-05 RX ORDER — LOSARTAN POTASSIUM AND HYDROCHLOROTHIAZIDE 12.5; 5 MG/1; MG/1
50-12.5 TABLET ORAL DAILY
Refills: 0 | Status: ACTIVE | COMMUNITY
Start: 2023-04-05

## 2023-04-20 ENCOUNTER — APPOINTMENT (OUTPATIENT)
Dept: ORTHOPEDIC SURGERY | Facility: CLINIC | Age: 73
End: 2023-04-20
Payer: MEDICAID

## 2023-04-20 VITALS
BODY MASS INDEX: 23.04 KG/M2 | HEIGHT: 63 IN | WEIGHT: 130 LBS | HEART RATE: 87 BPM | SYSTOLIC BLOOD PRESSURE: 131 MMHG | DIASTOLIC BLOOD PRESSURE: 74 MMHG | OXYGEN SATURATION: 79 %

## 2023-04-20 DIAGNOSIS — M17.0 BILATERAL PRIMARY OSTEOARTHRITIS OF KNEE: ICD-10-CM

## 2023-04-20 PROCEDURE — 99204 OFFICE O/P NEW MOD 45 MIN: CPT

## 2023-04-20 PROCEDURE — 73564 X-RAY EXAM KNEE 4 OR MORE: CPT | Mod: 50

## 2023-04-20 PROCEDURE — 72170 X-RAY EXAM OF PELVIS: CPT

## 2023-04-23 PROBLEM — M17.0 PRIMARY LOCALIZED OSTEOARTHRITIS OF BOTH KNEES: Status: ACTIVE | Noted: 2023-04-05

## 2023-04-23 NOTE — HISTORY OF PRESENT ILLNESS
[de-identified] : Samm  ID: [id number]\par \par Ms. Latif is a 73-year-old female presents to the office for evaluation of her bilateral knee pain.  Patient has been experiencing bilateral (right greater than left) knee pain for about 5 years.  Pain is located over the anterior and posterior knee bilaterally.  Pain is worse with stairs, walking, and sitting down.  Patient is unable to walk 1 block due to the pain.  Pain is improved with topical ointments.  She has tried gabapentin.  Patient is unable to tolerate NSAIDs due to history of gastritis.  Patient had tried physical therapy in 2015, which gave some relief.  She has tried a total of 8 corticosteroid injections, last in May 2022, which helped for about 6 to 7 months.  Pain is now affecting her quality of life.  No fevers or chills.\par \par History: Diabetes (Last A1c: 6.3), Arthritis, HLD, HTN, Gastritis, Breast Cancer

## 2023-04-23 NOTE — PHYSICAL EXAM
[de-identified] : Constitutional: 73 year old female, alert and oriented, cooperative, in no acute distress.\par \par HEENT \par NC/AT.  Appearance: symmetric\par \par Chest/Respiratory \par Respiratory effort: no intercostal retractions or use of accessory muscles. Nonlabored Breathing\par \par Mental Status: \par Judgment, insight: intact\par Orientation: oriented to time, place, and person\par \par Left Knee\par \par Inspection:\par     Skin intact, no rashes or lesions\par     No Effusion\par     Non-tender to palpation over tibial tubercle, patella, medial and lateral joint line, and pes insertion.\par     Tenderness over the posterior knee\par \par Range of Motion: Pain with knee flexion\par 	Extension - 0 degrees\par 	Flexion - 90 degrees\par 	Alignment - Varus 5 degrees\par 	Extensor lag: None\par \par Stability:\par      Demonstrates no Varus or Valgus instability\par      Negative Anterior or Posterior drawer.\par      Negative Lachman's\par      Negative McMurrays\par \par Patella: stable, tracks well. \par \par Right Knee\par \par Inspection:\par     Skin intact, no rashes or lesions\par     No Effusion\par     Non-tender to palpation over tibial tubercle, patella, lateral joint line, and pes insertion.\par     Tenderness over the posterior knee. Tenderness over the medial joint line at the midcoronal line\par \par Range of Motion: Pain with knee flexion\par 	Extension - -5 degrees\par 	Flexion - 90 degrees\par 	Alignment - Varus 5 degrees\par 	Extensor lag: None\par \par Stability:\par      Demonstrates no Varus or Valgus instability\par      Negative Anterior or Posterior drawer.\par      Negative Lachman's\par      Negative McMurrays\par \par Patella: stable, tracks well. \par \par Neurologic Exam\par     Motor intact including 5/5 Extensor Hallucis Longus, 5/5 Flexor Hallucis Longus, 5/5 Tibialis Anterior and 5/5 Gastrocnemius\par     Sensation Intact to Light Touch including Saphenous, Sural, Superficial Peroneal, Deep Peroneal, Tibial nerve distributions\par \par Vascular Exam\par     Foot is warm and well perfused with 2+ Dorsalis Pedis Pulse \par \par No pain with range of motion of the bilateral hips. No lumbar paraspinal muscle tenderness.  [de-identified] : XRay:  XRays of the Pelvis (1 View) taken in the office today and discussed with the patient. XRays demonstrate no obvious fracture or dislocation. There is no significant evidence of osteoarthritis or osteophyte formation. (my personal interpretation). \par \par XRay: XRays of the Right Knee (4 Views) taken in the office today and reviewed with the patient. XRays demonstrate tricompartmental joint space narrowing, worse in the medial compartment, with subchondral sclerosis, overlying osteophytes, all consistent with severe osteoarthritis, KL thGthrthathdtheth:th th4th. There is varus alignment. (my personal interpretation) \par \par XRay: XRays of the Left  Knee (4 Views) taken in the office today and reviewed with the patient. XRays demonstrate joint space narrowing in the medial compartment, consistent with osteoarthritis, KL Grade: 2-3. (my personal interpretation)

## 2023-04-23 NOTE — DISCUSSION/SUMMARY
[de-identified] : Ms. Latif is a 73-year-old female present to the office for evaluation of her bilateral knee pain.  Patient has been experiencing bilateral (right greater than left) knee pain for about 5 years.  She has tried physical therapy, anti-inflammatories, and injections.  Her last injection was in May 2022.  X-rays showed severe right knee osteoarthritis.  Examination showed tenderness over the bilateral knees and decreased bilateral knee range of motion. Discussed with patient the examination and imaging findings.  Discussed with patient the operative and nonoperative management of knee osteoarthritis, including total knee arthroplasty.  Discussed the nonoperative management of knee osteoarthritis, including physical therapy, anti-inflammatories, and injections.  Patient has tried nonoperative management, but continues to have knee pain.  Discussed total knee arthroplasty at length, including surgical procedure, hospital course, DVT prophylaxis, antibiotics, physical therapy, recovery, and risks. Patient would like to proceed with total knee arthroplasty.  Discussed that patient will require medical clearance prior to surgery.  Discussed obtaining a CT scan prior to surgery.  Patient understanding and in agreement with the plan.  All questions answered. \par \par Discussed the imaging and physical exam findings with the patient consistent with endstage knee degenerative disease. The patient has failed conservative management including physical therapy, anti-inflammatories, and injections. The risks, benefits and alternatives to total knee replacement were discussed with the patient in detail and the patient elected to proceed with surgery. Discussed the surgical plan with the patient including implant options and surgical approach. \par \par Surgical risks including fracture requiring fixation, instability or dislocation, temporary or permanent leg length inequality, infection, bleeding, stiffness, failure to alleviate pain, failure to achieve desired results, need for further surgery, scar tissue formation, hardware failure, chronic pain, injury to nerves resulting in extremity dysfunction, injury to arteries and veins, deep vein thrombosis or pulmonary embolism requiring anticoagulation and medical risk factors including heart attack, stroke, death, neurological injury, pneumonia, kidney or other organ failure were discussed with the patient. \par \par The patient was also advised that the patient will undergo testing for COVID-19 prior to surgery and that if any evidence of COVID-19 is noted pre-operatively, the surgery may be cancelled. \par \par Patient was understanding and in agreement with the treatment plan. All questions answered.\par \par Plan:\par -Physical therapy\par -Medicine Clearance\par -Follow up in 1 month for reevaluation and management\par -Right Total Knee Arthroplasty\par \par Surgical Plan:\par Diagnosis: Right Knee Osteoarthritis\par Laterality: Right\par Operative procedure: Right Total Knee Arthroplasty\par Location: [facility]\par \par DVT prophylaxis: Aspirin 325mg twice per day\par TXA: IV\par Plan for discharge: Home\par Pre- & Post Operative Antibiotics: Cefazolin 2g\par \par Clearances:\par      Medical: Pending\par \par Comorbidities:\par      Metal Allergy: Negative\par      Chronic Pain: Negative\par      Diabetes: Positive, Last A1C: 6.3\par      Use of Anticoagulation: Negative\par      Atrial Fibrillation: Negative\par      History of VTE: Negative\par      History of Cardiac Stents: Negative\par      Skin Infections/Open Wounds: Negative\par      MRSA Infection/Colonization: Negative\par      Current Urinary Symptoms: Negative\par      Immunocompromise: Negative\par      Inflammatory Arthritis: Negative\par      Smoking: Negative\par      Drug Use: Negative\par      Alcohol Use: Negative\par      Obstructive Sleep Apnea: Negative\par      Neurologic Disease: Negative

## 2023-05-08 NOTE — ED PROVIDER NOTE - CHPI ED SYMPTOMS POS
79M with PMH multiple medical comorbidities who presented to hospital with AMS likely 2' toxic metabolic encephalopathy 2' transient bacteremia in setting of prostate bx. Pt seen at bedside. Doing well. Voiding w/o issues. No complaints. Tolerating PO. Stable for DC home. Labs stable.      #Transient K pneumonia bacteremia 2' prostate bx  #DM2 w/ neuropathy  #TIA  #HTN  #HLD  #SDH s/p L MMA embolization  #NPH s/p  shunt    -Repeat bcx negative.  -Cont Cipro till completion.  -PT was recommended, but wife wants pt home. DC home with family.    31 min spent preparing DC, counseling pt, and coordination. PAIN

## 2023-05-17 ENCOUNTER — OUTPATIENT (OUTPATIENT)
Dept: OUTPATIENT SERVICES | Facility: HOSPITAL | Age: 73
LOS: 1 days | End: 2023-05-17
Payer: MEDICAID

## 2023-05-17 ENCOUNTER — APPOINTMENT (OUTPATIENT)
Dept: CT IMAGING | Facility: IMAGING CENTER | Age: 73
End: 2023-05-17
Payer: MEDICAID

## 2023-05-17 DIAGNOSIS — M17.11 UNILATERAL PRIMARY OSTEOARTHRITIS, RIGHT KNEE: ICD-10-CM

## 2023-05-17 DIAGNOSIS — Z98.890 OTHER SPECIFIED POSTPROCEDURAL STATES: Chronic | ICD-10-CM

## 2023-05-17 PROCEDURE — 73700 CT LOWER EXTREMITY W/O DYE: CPT

## 2023-05-17 PROCEDURE — 73700 CT LOWER EXTREMITY W/O DYE: CPT | Mod: 26,RT

## 2023-05-25 ENCOUNTER — APPOINTMENT (OUTPATIENT)
Dept: ORTHOPEDIC SURGERY | Facility: CLINIC | Age: 73
End: 2023-05-25
Payer: MEDICAID

## 2023-05-25 DIAGNOSIS — M17.11 UNILATERAL PRIMARY OSTEOARTHRITIS, RIGHT KNEE: ICD-10-CM

## 2023-05-25 PROCEDURE — 99214 OFFICE O/P EST MOD 30 MIN: CPT

## 2023-05-25 NOTE — HISTORY OF PRESENT ILLNESS
[de-identified] : 5/25/2023\par Samm  ID: [id number]\par \par Miracle Latif presented to the office for follow-up of her bilateral knee pain.  Patient has been experiencing bilateral (right greater than left) knee pain for about 5 years.  She continues to have significant knee pain.  Pain is worse with standing, sitting, and walking.  She has been in physical therapy, which gives some relief and her knee range of motion is doing well.  However, she continues to have difficulty bending the knee, especially when sitting.  Her pain continues to affect her quality of life.  She has tried physical therapy, corticosteroid injections (8 times, last in May 2022), and pain medications.  Patient is unable to take NSAIDs due to her gastritis.  Patient was seen by her primary care physician, who did an EKG and patient underwent blood work on 5/20/2023.  She is waiting the results of her blood work for medical clearance.  Of note, patient reports a possible issue or allergy with aspirin about 30 to 40 years ago.\par \par PCP: Marina Scott MD, [phone]\par \par 4/20/2023\[facility]  ID: [id number]\par \par Ms. Latif is a 73-year-old female presents to the office for evaluation of her bilateral knee pain.  Patient has been experiencing bilateral (right greater than left) knee pain for about 5 years.  Pain is located over the anterior and posterior knee bilaterally.  Pain is worse with stairs, walking, and sitting down.  Patient is unable to walk 1 block due to the pain.  Pain is improved with topical ointments.  She has tried gabapentin.  Patient is unable to tolerate NSAIDs due to history of gastritis.  Patient had tried physical therapy in 2015, which gave some relief.  She has tried a total of 8 corticosteroid injections, last in May 2022, which helped for about 6 to 7 months.  Pain is now affecting her quality of life.  No fevers or chills.\par \par History: Diabetes (Last A1c: 6.3), Arthritis, HLD, HTN, Gastritis, Breast Cancer

## 2023-05-25 NOTE — DISCUSSION/SUMMARY
[de-identified] : Ms. Latif is a 73-year-old female present to the office for evaluation of her bilateral knee pain.  Patient has been experiencing bilateral (right greater than left) knee pain for about 5 years.  She has tried physical therapy, anti-inflammatories, and injections.  Her last injection was in May 2022.  Prior x-rays showed severe right knee osteoarthritis.  Examination showed tenderness over the bilateral knees and decreased bilateral knee range of motion. Discussed with patient the examination and imaging findings.  Discussed with patient the operative and nonoperative management of knee osteoarthritis, including total knee arthroplasty.  Discussed the nonoperative management of knee osteoarthritis, including physical therapy, anti-inflammatories, and injections.  Patient has tried nonoperative management, but continues to have knee pain.  Discussed total knee arthroplasty at length, including surgical procedure, hospital course, DVT prophylaxis, antibiotics, physical therapy, recovery, and risks. Patient would like to proceed with total knee arthroplasty.  Discussed that patient will require medical clearance prior to surgery.  Patient is awaiting blood work results for her medical clearance.  Patient has obtained her CT scan.  Patient understanding and in agreement with the plan.  All questions answered. \par \par Discussed the imaging and physical exam findings with the patient consistent with endstage knee degenerative disease. The patient has failed conservative management including physical therapy, anti-inflammatories, and injections. The risks, benefits and alternatives to total knee replacement were discussed with the patient in detail and the patient elected to proceed with surgery. Discussed the surgical plan with the patient including implant options and surgical approach. \par \par Surgical risks including fracture requiring fixation, instability or dislocation, temporary or permanent leg length inequality, infection, bleeding, stiffness, failure to alleviate pain, failure to achieve desired results, need for further surgery, scar tissue formation, hardware failure, chronic pain, injury to nerves resulting in extremity dysfunction, injury to arteries and veins, deep vein thrombosis or pulmonary embolism requiring anticoagulation and medical risk factors including heart attack, stroke, death, neurological injury, pneumonia, kidney or other organ failure were discussed with the patient. \par \par Patient was understanding and in agreement with the treatment plan. All questions answered.\par \par Plan:\par -Continue physical therapy\par -Medicine Clearance: Pending blood work\par -CT Scan Right Lower Extremity: Completed\par -Right Total Knee Arthroplasty\par \par Surgical Plan:\par Diagnosis: Right Knee Osteoarthritis\par Laterality: Right\par Operative procedure: Right Total Knee Arthroplasty\par Location: [facility]\par \par DVT prophylaxis: Aspirin 325mg twice per day (possible Allergy 30-40 years ago) versus Eliquis or Xarelto\par TXA: IV\par Plan for discharge: Home\par Pre- & Post Operative Antibiotics: Cefazolin 2g\par \par Clearances:\par      Medical: Pending blood work\par \par Comorbidities:\par      Metal Allergy: Negative\par      Chronic Pain: Negative\par      Diabetes: Positive, Last A1C: 6.3\par      Use of Anticoagulation: Negative\par      Atrial Fibrillation: Negative\par      History of VTE: Negative\par      History of Cardiac Stents: Negative\par      Skin Infections/Open Wounds: Negative\par      MRSA Infection/Colonization: Negative\par      Current Urinary Symptoms: Negative\par      Immunocompromise: Negative\par      Inflammatory Arthritis: Negative\par      Smoking: Negative\par      Drug Use: Negative\par      Alcohol Use: Negative\par      Obstructive Sleep Apnea: Negative\par      Neurologic Disease: Negative

## 2023-05-25 NOTE — PHYSICAL EXAM
[de-identified] : Constitutional: 73 year old female, alert and oriented, cooperative, in no acute distress.\par \par HEENT \par NC/AT.  Appearance: symmetric\par \par Chest/Respiratory \par Respiratory effort: no intercostal retractions or use of accessory muscles. Nonlabored Breathing\par \par Mental Status: \par Judgment, insight: intact\par Orientation: oriented to time, place, and person\par \par Left Knee\par \par Inspection:\par     Skin intact, no rashes or lesions\par     No Effusion\par     Non-tender to palpation over tibial tubercle, patella, medial and lateral joint line, and pes insertion.\par     Tenderness over the posterior knee\par \par Range of Motion: Pain with knee flexion\par 	Extension - 0 degrees\par 	Flexion - 90 degrees\par 	Alignment - Varus 5 degrees\par 	Extensor lag: None\par \par Stability:\par      Demonstrates no Varus or Valgus instability\par      Negative Anterior or Posterior drawer.\par      Negative Lachman's\par      Negative McMurrays\par \par Patella: stable, tracks well. \par \par Right Knee\par \par Inspection:\par     Skin intact, no rashes or lesions\par     No Effusion\par     Non-tender to palpation over tibial tubercle, patella, and pes insertion.\par     Tenderness over the posterior knee. Tenderness over the medial and lateral joint lines at the midcoronal line\par \par Range of Motion: Pain with knee flexion\par 	Extension - -5 degrees\par 	Flexion - 90 degrees\par 	Alignment - Varus 5 degrees\par 	Extensor lag: None\par \par Stability:\par      Demonstrates no Varus or Valgus instability\par      Negative Anterior or Posterior drawer.\par      Negative Lachman's\par      Negative McMurrays\par \par Patella: stable, tracks well. \par \par Neurologic Exam\par     Motor intact including 5/5 Extensor Hallucis Longus, 5/5 Flexor Hallucis Longus, 5/5 Tibialis Anterior and 5/5 Gastrocnemius\par     Sensation Intact to Light Touch including Saphenous, Sural, Superficial Peroneal, Deep Peroneal, Tibial nerve distributions\par \par Vascular Exam\par     Foot is warm and well perfused with 2+ Dorsalis Pedis Pulse \par \par No pain with range of motion of the bilateral hips. No lumbar paraspinal muscle tenderness.  [de-identified] : XRay:  XRays of the Pelvis (1 View) taken on 4/20/2023. XRays demonstrate no obvious fracture or dislocation. There is no significant evidence of osteoarthritis or osteophyte formation. (my personal interpretation). \par \par XRay: XRays of the Right Knee (4 Views) taken on 4/20/2023. XRays demonstrate tricompartmental joint space narrowing, worse in the medial compartment, with subchondral sclerosis, overlying osteophytes, all consistent with severe osteoarthritis, KL thGthrthathdtheth:th th4th. There is varus alignment. (my personal interpretation) \par \par XRay: XRays of the Left  Knee (4 Views) taken on 4/20/2023. XRays demonstrate joint space narrowing in the medial compartment, consistent with osteoarthritis, KL Grade: 3. (my personal interpretation)

## 2023-06-01 ENCOUNTER — OUTPATIENT (OUTPATIENT)
Dept: OUTPATIENT SERVICES | Facility: HOSPITAL | Age: 73
LOS: 1 days | End: 2023-06-01

## 2023-06-01 VITALS
HEART RATE: 90 BPM | DIASTOLIC BLOOD PRESSURE: 75 MMHG | TEMPERATURE: 97 F | SYSTOLIC BLOOD PRESSURE: 123 MMHG | RESPIRATION RATE: 16 BRPM | OXYGEN SATURATION: 97 % | WEIGHT: 119.93 LBS | HEIGHT: 60 IN

## 2023-06-01 DIAGNOSIS — M17.11 UNILATERAL PRIMARY OSTEOARTHRITIS, RIGHT KNEE: ICD-10-CM

## 2023-06-01 DIAGNOSIS — I10 ESSENTIAL (PRIMARY) HYPERTENSION: ICD-10-CM

## 2023-06-01 DIAGNOSIS — E11.9 TYPE 2 DIABETES MELLITUS WITHOUT COMPLICATIONS: ICD-10-CM

## 2023-06-01 DIAGNOSIS — Z98.890 OTHER SPECIFIED POSTPROCEDURAL STATES: Chronic | ICD-10-CM

## 2023-06-01 LAB
A1C WITH ESTIMATED AVERAGE GLUCOSE RESULT: 6.9 % — HIGH (ref 4–5.6)
APPEARANCE UR: CLEAR — SIGNIFICANT CHANGE UP
BACTERIA # UR AUTO: NEGATIVE — SIGNIFICANT CHANGE UP
BILIRUB UR-MCNC: NEGATIVE — SIGNIFICANT CHANGE UP
BLD GP AB SCN SERPL QL: NEGATIVE — SIGNIFICANT CHANGE UP
COLOR SPEC: YELLOW — SIGNIFICANT CHANGE UP
DIFF PNL FLD: ABNORMAL
EPI CELLS # UR: 1 /HPF — SIGNIFICANT CHANGE UP (ref 0–5)
ESTIMATED AVERAGE GLUCOSE: 151 — SIGNIFICANT CHANGE UP
GLUCOSE UR QL: NEGATIVE — SIGNIFICANT CHANGE UP
HYALINE CASTS # UR AUTO: 0 /LPF — SIGNIFICANT CHANGE UP (ref 0–7)
KETONES UR-MCNC: NEGATIVE — SIGNIFICANT CHANGE UP
LEUKOCYTE ESTERASE UR-ACNC: ABNORMAL
NITRITE UR-MCNC: NEGATIVE — SIGNIFICANT CHANGE UP
PH UR: 6 — SIGNIFICANT CHANGE UP (ref 5–8)
PROT UR-MCNC: ABNORMAL
RBC CASTS # UR COMP ASSIST: 4 /HPF — SIGNIFICANT CHANGE UP (ref 0–4)
RH IG SCN BLD-IMP: POSITIVE — SIGNIFICANT CHANGE UP
SP GR SPEC: 1.02 — SIGNIFICANT CHANGE UP (ref 1.01–1.05)
UROBILINOGEN FLD QL: SIGNIFICANT CHANGE UP
WBC UR QL: 3 /HPF — SIGNIFICANT CHANGE UP (ref 0–5)

## 2023-06-01 RX ORDER — OMEPRAZOLE 10 MG/1
40 CAPSULE, DELAYED RELEASE ORAL
Qty: 0 | Refills: 0 | DISCHARGE

## 2023-06-01 RX ORDER — CLARITHROMYCIN 500 MG
1 TABLET ORAL
Qty: 0 | Refills: 0 | DISCHARGE

## 2023-06-01 RX ORDER — SIMVASTATIN 20 MG/1
1 TABLET, FILM COATED ORAL
Qty: 0 | Refills: 0 | DISCHARGE

## 2023-06-01 RX ORDER — SODIUM CHLORIDE 9 MG/ML
1000 INJECTION, SOLUTION INTRAVENOUS
Refills: 0 | Status: DISCONTINUED | OUTPATIENT
Start: 2023-06-05 | End: 2023-06-07

## 2023-06-01 RX ORDER — GABAPENTIN 400 MG/1
300 CAPSULE ORAL
Qty: 0 | Refills: 0 | DISCHARGE

## 2023-06-01 RX ORDER — AMOXICILLIN 250 MG/5ML
1 SUSPENSION, RECONSTITUTED, ORAL (ML) ORAL
Qty: 0 | Refills: 0 | DISCHARGE

## 2023-06-01 RX ORDER — SITAGLIPTIN AND METFORMIN HYDROCHLORIDE 500; 50 MG/1; MG/1
1 TABLET, FILM COATED ORAL
Qty: 0 | Refills: 0 | DISCHARGE

## 2023-06-01 NOTE — H&P PST ADULT - NSICDXPASTMEDICALHX_GEN_ALL_CORE_FT
PAST MEDICAL HISTORY:  Breast CA     Diabetes     Dry eyes     HLD (hyperlipidemia)     Hypertension     Osteoarthritis

## 2023-06-01 NOTE — H&P PST ADULT - PROBLEM SELECTOR PLAN 2
Patient instructed to take losartan with a sip of water on the morning of procedure. Patient instructed to take losartan with a sip of water on the morning of procedure.    copy of CBC , BMP, EKG and medical eval in the chart    ordered in T & S, UA, Ucx, A1c, MRSA Patient instructed to take losartan with a sip of water on the morning of procedure.    copy of CBC , BMP, EKG and medical eval in the chart    ordered T & S, UA, Ucx, A1c, MRSA

## 2023-06-01 NOTE — H&P PST ADULT - ATTENDING COMMENTS
Miracle Laitf is a 73 year old female, past medical history of diabetes, hyperlipidemia, hypertension, gastritis, and breast cancer, who presented to the office for evaluation of her bilateral knee pain. Patient had been experiencing bilateral (right greater than left) knee pain for about 5 years. Pain was located over the anterior and posterior knees bilaterally. Pain was worse with standing, sitting, and walking. Patient had tried physical therapy, Gabapentin, topical ointments, and injections. She is unable to take NSAIDs due to her gastritis. She tried 8 corticosteroid injections, last in May 2022. Pain was affecting her quality of life. XRays showed severe right knee osteoarthritis. Patient was indicated for a right total knee arthroplasty. She was cleared by medicine.    Discussed the operative and nonoperative management of knee osteoarthritis at length with the patient. Nonoperative management would consist of pain control, physical therapy, and anti-inflammatories. The patient had failed conservative management, including physical therapy, anti-inflammatories, and injections. The patient did not want to continue nonoperative management due to the impact knee pain has had on the patient’s quality of life. Operative management would consist of total knee arthroplasty. The risks, benefits and alternatives to total knee arthroplasty were discussed with the patient in detail and the patient elected to proceed with surgery. Discussed the surgical plan and implants with the patient, including robotic assisted total knee arthroplasty. Discussed the recovery process following total knee    arthroplasty at length, including, but not limited to, inpatient hospital stay, physical therapy, recovery, antibiotics, and DVT prophylaxis. Surgical risks including fracture requiring fixation, instability or dislocation, temporary or permanent leg length inequality, risks of cementation, infection, bleeding, stiffness, failure to alleviate pain, failure to achieve desired results, need for further surgery, scar tissue formation, hardware failure, component loosening, chronic pain, injury to nerves resulting in extremity dysfunction, injury to arteries and veins, deep vein thrombosis or pulmonary embolism requiring anticoagulation and medical risk factors including heart attack, stroke, death, neurological injury, pneumonia, kidney or other organ failure were discussed with the patient at length.    Following discussion, patient elected to proceed with Right Total Knee Arthroplasty with [employer] Robotic Assistance. Informed consent was obtained. Patient's leg was then marked with verbal confirmation of the patient. Patient was understanding and in agreement with the operative plan. All questions were answered.    Physical Exam:  Skin intact, no erythema  Motor: Intact EHL/FHL/Tibialis Anterior/Gastrocnemius  Sensory: Intact Superficial Peroneal/Deep Peroneal/Saphenous/Sural/Tibial Nerves  Vascular: 2+ DP Pulse    Assessment/Plan:  Miracle Latif is 73 year old female who presented for a Right Total Knee Arthroplasty with [employer] Robotic Assistance.    Plan:  -Right Total Knee Arthroplasty with [employer] Robotic Assistance.  -Clearance in chart S[facility]  ID: [id number]    Miracle Latif is a 73 year old female, past medical history of diabetes, hyperlipidemia, hypertension, gastritis, and breast cancer, who presented to the office for evaluation of her bilateral knee pain. Patient had been experiencing bilateral (right greater than left) knee pain for about 5 years. Pain was located over the anterior and posterior knees bilaterally. Pain was worse with standing, sitting, and walking. Patient had tried physical therapy, Gabapentin, topical ointments, and injections. She is unable to take NSAIDs due to her gastritis. She tried 8 corticosteroid injections, last in May 2022. Pain was affecting her quality of life. XRays showed severe right knee osteoarthritis. Patient was indicated for a right total knee arthroplasty. She was cleared by medicine.    Discussed the operative and nonoperative management of knee osteoarthritis at length with the patient. Nonoperative management would consist of pain control, physical therapy, and anti-inflammatories. The patient had failed conservative management, including physical therapy, anti-inflammatories, and injections. The patient did not want to continue nonoperative management due to the impact knee pain has had on the patient’s quality of life. Operative management would consist of total knee arthroplasty. The risks, benefits and alternatives to total knee arthroplasty were discussed with the patient in detail and the patient elected to proceed with surgery. Discussed the surgical plan and implants with the patient, including robotic assisted total knee arthroplasty. Discussed the recovery process following total knee    arthroplasty at length, including, but not limited to, inpatient hospital stay, physical therapy, recovery, antibiotics, and DVT prophylaxis. Surgical risks including fracture requiring fixation, instability or dislocation, temporary or permanent leg length inequality, risks of cementation, infection, bleeding, stiffness, failure to alleviate pain, failure to achieve desired results, need for further surgery, scar tissue formation, hardware failure, component loosening, chronic pain, injury to nerves resulting in extremity dysfunction, injury to arteries and veins, deep vein thrombosis or pulmonary embolism requiring anticoagulation and medical risk factors including heart attack, stroke, death, neurological injury, pneumonia, kidney or other organ failure were discussed with the patient at length.    Following discussion, patient elected to proceed with Right Total Knee Arthroplasty with [employer] Robotic Assistance. Informed consent was obtained. Patient's leg was then marked with verbal confirmation of the patient. Patient was understanding and in agreement with the operative plan. All questions were answered.    Physical Exam:  Skin intact, no erythema  Motor: Intact EHL/FHL/Tibialis Anterior/Gastrocnemius  Sensory: Intact Superficial Peroneal/Deep Peroneal/Saphenous/Sural/Tibial Nerves  Vascular: 2+ DP Pulse    Assessment/Plan:  Miracle Latif is 73 year old female who presented for a Right Total Knee Arthroplasty with [employer] Robotic Assistance.    Plan:  -Right Total Knee Arthroplasty with [employer] Robotic Assistance.  -Clearance in chart

## 2023-06-01 NOTE — H&P PST ADULT - HISTORY OF PRESENT ILLNESS
Patient is preop dx of unilateral primary osteoarthritis of right knee. Patient is scheduled for right total knee arthroplasty robotic assisted Darvin

## 2023-06-01 NOTE — H&P PST ADULT - NECK
patient lower bridge/normal/supple/symmetric/no tracheal deviation/no thyromegaly/no palpable masses/asymmetric

## 2023-06-01 NOTE — H&P PST ADULT - PROBLEM SELECTOR PLAN 1
Patient tentatively scheduled for right total knee arthroplasty robotic assisted Darvin       Pre-op instructions provided. Pt given verbal and written instructions with teach back on chlorhexidine shampoo. Pt verbalized understanding with return demonstration.

## 2023-06-02 PROBLEM — M19.90 UNSPECIFIED OSTEOARTHRITIS, UNSPECIFIED SITE: Chronic | Status: ACTIVE | Noted: 2023-06-01

## 2023-06-02 PROBLEM — I10 ESSENTIAL (PRIMARY) HYPERTENSION: Chronic | Status: ACTIVE | Noted: 2023-06-01

## 2023-06-02 PROBLEM — H04.123 DRY EYE SYNDROME OF BILATERAL LACRIMAL GLANDS: Chronic | Status: ACTIVE | Noted: 2023-06-01

## 2023-06-02 LAB
CULTURE RESULTS: SIGNIFICANT CHANGE UP
MRSA PCR RESULT.: SIGNIFICANT CHANGE UP
S AUREUS DNA NOSE QL NAA+PROBE: SIGNIFICANT CHANGE UP
SPECIMEN SOURCE: SIGNIFICANT CHANGE UP

## 2023-06-02 NOTE — ASU PATIENT PROFILE, ADULT - FALL HARM RISK - UNIVERSAL INTERVENTIONS
Bed in lowest position, wheels locked, appropriate side rails in place/Call bell, personal items and telephone in reach/Instruct patient to call for assistance before getting out of bed or chair/Non-slip footwear when patient is out of bed/Harts to call system/Physically safe environment - no spills, clutter or unnecessary equipment/Purposeful Proactive Rounding/Room/bathroom lighting operational, light cord in reach

## 2023-06-04 ENCOUNTER — TRANSCRIPTION ENCOUNTER (OUTPATIENT)
Age: 73
End: 2023-06-04

## 2023-06-05 ENCOUNTER — INPATIENT (INPATIENT)
Facility: HOSPITAL | Age: 73
LOS: 2 days | Discharge: NURSING, REHAB OR LONG-TERM CARE | End: 2023-06-08
Attending: STUDENT IN AN ORGANIZED HEALTH CARE EDUCATION/TRAINING PROGRAM | Admitting: STUDENT IN AN ORGANIZED HEALTH CARE EDUCATION/TRAINING PROGRAM
Payer: MEDICAID

## 2023-06-05 ENCOUNTER — APPOINTMENT (OUTPATIENT)
Dept: ORTHOPEDIC SURGERY | Facility: HOSPITAL | Age: 73
End: 2023-06-05

## 2023-06-05 ENCOUNTER — TRANSCRIPTION ENCOUNTER (OUTPATIENT)
Age: 73
End: 2023-06-05

## 2023-06-05 VITALS
WEIGHT: 119.93 LBS | HEIGHT: 60 IN | OXYGEN SATURATION: 97 % | HEART RATE: 90 BPM | DIASTOLIC BLOOD PRESSURE: 75 MMHG | TEMPERATURE: 98 F | RESPIRATION RATE: 16 BRPM | SYSTOLIC BLOOD PRESSURE: 145 MMHG

## 2023-06-05 DIAGNOSIS — M17.11 UNILATERAL PRIMARY OSTEOARTHRITIS, RIGHT KNEE: ICD-10-CM

## 2023-06-05 DIAGNOSIS — Z98.890 OTHER SPECIFIED POSTPROCEDURAL STATES: Chronic | ICD-10-CM

## 2023-06-05 LAB
GLUCOSE BLDC GLUCOMTR-MCNC: 298 MG/DL — HIGH (ref 70–99)
GLUCOSE BLDC GLUCOMTR-MCNC: 306 MG/DL — HIGH (ref 70–99)
GLUCOSE BLDC GLUCOMTR-MCNC: 75 MG/DL — SIGNIFICANT CHANGE UP (ref 70–99)
GLUCOSE BLDC GLUCOMTR-MCNC: 87 MG/DL — SIGNIFICANT CHANGE UP (ref 70–99)

## 2023-06-05 PROCEDURE — 73560 X-RAY EXAM OF KNEE 1 OR 2: CPT | Mod: 26,RT

## 2023-06-05 PROCEDURE — 27447 TOTAL KNEE ARTHROPLASTY: CPT | Mod: RT

## 2023-06-05 DEVICE — COMP FEM TRIATHLON CR SZ 1 RT: Type: IMPLANTABLE DEVICE | Site: RIGHT | Status: FUNCTIONAL

## 2023-06-05 DEVICE — MAKO BONE PIN 3.2MM X 140MM: Type: IMPLANTABLE DEVICE | Site: RIGHT | Status: FUNCTIONAL

## 2023-06-05 DEVICE — INSERT TIB BEARING CS X3 SZ 2 9MM: Type: IMPLANTABLE DEVICE | Site: RIGHT | Status: FUNCTIONAL

## 2023-06-05 DEVICE — MAKO BONE PIN 3.2MM X 110MM: Type: IMPLANTABLE DEVICE | Site: RIGHT | Status: FUNCTIONAL

## 2023-06-05 DEVICE — CEMENT SIMPLEX P 40GM: Type: IMPLANTABLE DEVICE | Site: RIGHT | Status: FUNCTIONAL

## 2023-06-05 DEVICE — IMP PATELLA ASYMMETRIC X3 29X9MM: Type: IMPLANTABLE DEVICE | Site: RIGHT | Status: FUNCTIONAL

## 2023-06-05 DEVICE — BASEPLATE TIB UNIV TRIATHLON SZ 2: Type: IMPLANTABLE DEVICE | Site: RIGHT | Status: FUNCTIONAL

## 2023-06-05 RX ORDER — OXYCODONE HYDROCHLORIDE 5 MG/1
10 TABLET ORAL EVERY 4 HOURS
Refills: 0 | Status: DISCONTINUED | OUTPATIENT
Start: 2023-06-05 | End: 2023-06-08

## 2023-06-05 RX ORDER — TRAMADOL HYDROCHLORIDE 50 MG/1
50 TABLET ORAL ONCE
Refills: 0 | Status: DISCONTINUED | OUTPATIENT
Start: 2023-06-05 | End: 2023-06-05

## 2023-06-05 RX ORDER — PANTOPRAZOLE SODIUM 20 MG/1
40 TABLET, DELAYED RELEASE ORAL
Refills: 0 | Status: DISCONTINUED | OUTPATIENT
Start: 2023-06-05 | End: 2023-06-08

## 2023-06-05 RX ORDER — RIVAROXABAN 15 MG-20MG
10 KIT ORAL DAILY
Refills: 0 | Status: DISCONTINUED | OUTPATIENT
Start: 2023-06-06 | End: 2023-06-08

## 2023-06-05 RX ORDER — CELECOXIB 200 MG/1
200 CAPSULE ORAL EVERY 12 HOURS
Refills: 0 | Status: DISCONTINUED | OUTPATIENT
Start: 2023-06-06 | End: 2023-06-08

## 2023-06-05 RX ORDER — ATORVASTATIN CALCIUM 80 MG/1
10 TABLET, FILM COATED ORAL AT BEDTIME
Refills: 0 | Status: DISCONTINUED | OUTPATIENT
Start: 2023-06-05 | End: 2023-06-08

## 2023-06-05 RX ORDER — SODIUM CHLORIDE 9 MG/ML
500 INJECTION, SOLUTION INTRAVENOUS ONCE
Refills: 0 | Status: COMPLETED | OUTPATIENT
Start: 2023-06-05 | End: 2023-06-06

## 2023-06-05 RX ORDER — HYDROMORPHONE HYDROCHLORIDE 2 MG/ML
0.25 INJECTION INTRAMUSCULAR; INTRAVENOUS; SUBCUTANEOUS
Refills: 0 | Status: DISCONTINUED | OUTPATIENT
Start: 2023-06-05 | End: 2023-06-05

## 2023-06-05 RX ORDER — HYDROMORPHONE HYDROCHLORIDE 2 MG/ML
0.5 INJECTION INTRAMUSCULAR; INTRAVENOUS; SUBCUTANEOUS
Refills: 0 | Status: DISCONTINUED | OUTPATIENT
Start: 2023-06-05 | End: 2023-06-05

## 2023-06-05 RX ORDER — ONDANSETRON 8 MG/1
4 TABLET, FILM COATED ORAL EVERY 6 HOURS
Refills: 0 | Status: DISCONTINUED | OUTPATIENT
Start: 2023-06-05 | End: 2023-06-08

## 2023-06-05 RX ORDER — SODIUM CHLORIDE 9 MG/ML
1000 INJECTION, SOLUTION INTRAVENOUS
Refills: 0 | Status: DISCONTINUED | OUTPATIENT
Start: 2023-06-05 | End: 2023-06-08

## 2023-06-05 RX ORDER — CHLORHEXIDINE GLUCONATE 213 G/1000ML
1 SOLUTION TOPICAL ONCE
Refills: 0 | Status: COMPLETED | OUTPATIENT
Start: 2023-06-05 | End: 2023-06-05

## 2023-06-05 RX ORDER — INSULIN LISPRO 100/ML
VIAL (ML) SUBCUTANEOUS
Refills: 0 | Status: DISCONTINUED | OUTPATIENT
Start: 2023-06-05 | End: 2023-06-08

## 2023-06-05 RX ORDER — INSULIN LISPRO 100/ML
VIAL (ML) SUBCUTANEOUS AT BEDTIME
Refills: 0 | Status: DISCONTINUED | OUTPATIENT
Start: 2023-06-05 | End: 2023-06-08

## 2023-06-05 RX ORDER — ACETAMINOPHEN 500 MG
1000 TABLET ORAL ONCE
Refills: 0 | Status: COMPLETED | OUTPATIENT
Start: 2023-06-06 | End: 2023-06-06

## 2023-06-05 RX ORDER — ACETAMINOPHEN 500 MG
1000 TABLET ORAL ONCE
Refills: 0 | Status: COMPLETED | OUTPATIENT
Start: 2023-06-05 | End: 2023-06-05

## 2023-06-05 RX ORDER — GLUCAGON INJECTION, SOLUTION 0.5 MG/.1ML
1 INJECTION, SOLUTION SUBCUTANEOUS ONCE
Refills: 0 | Status: DISCONTINUED | OUTPATIENT
Start: 2023-06-05 | End: 2023-06-08

## 2023-06-05 RX ORDER — LOSARTAN POTASSIUM 100 MG/1
1 TABLET, FILM COATED ORAL
Refills: 0 | DISCHARGE

## 2023-06-05 RX ORDER — LOSARTAN POTASSIUM 100 MG/1
50 TABLET, FILM COATED ORAL DAILY
Refills: 0 | Status: DISCONTINUED | OUTPATIENT
Start: 2023-06-05 | End: 2023-06-08

## 2023-06-05 RX ORDER — TRAMADOL HYDROCHLORIDE 50 MG/1
50 TABLET ORAL EVERY 6 HOURS
Refills: 0 | Status: DISCONTINUED | OUTPATIENT
Start: 2023-06-05 | End: 2023-06-08

## 2023-06-05 RX ORDER — DEXTROSE 50 % IN WATER 50 %
12.5 SYRINGE (ML) INTRAVENOUS ONCE
Refills: 0 | Status: DISCONTINUED | OUTPATIENT
Start: 2023-06-05 | End: 2023-06-08

## 2023-06-05 RX ORDER — DEXTROSE 50 % IN WATER 50 %
25 SYRINGE (ML) INTRAVENOUS ONCE
Refills: 0 | Status: DISCONTINUED | OUTPATIENT
Start: 2023-06-05 | End: 2023-06-08

## 2023-06-05 RX ORDER — GABAPENTIN 400 MG/1
300 CAPSULE ORAL AT BEDTIME
Refills: 0 | Status: DISCONTINUED | OUTPATIENT
Start: 2023-06-05 | End: 2023-06-08

## 2023-06-05 RX ORDER — PANTOPRAZOLE SODIUM 20 MG/1
40 TABLET, DELAYED RELEASE ORAL ONCE
Refills: 0 | Status: COMPLETED | OUTPATIENT
Start: 2023-06-05 | End: 2023-06-05

## 2023-06-05 RX ORDER — SIMVASTATIN 20 MG/1
1 TABLET, FILM COATED ORAL
Refills: 0 | DISCHARGE

## 2023-06-05 RX ORDER — SODIUM CHLORIDE 9 MG/ML
500 INJECTION, SOLUTION INTRAVENOUS ONCE
Refills: 0 | Status: COMPLETED | OUTPATIENT
Start: 2023-06-05 | End: 2023-06-05

## 2023-06-05 RX ORDER — POLYETHYLENE GLYCOL 3350 17 G/17G
17 POWDER, FOR SOLUTION ORAL AT BEDTIME
Refills: 0 | Status: DISCONTINUED | OUTPATIENT
Start: 2023-06-05 | End: 2023-06-08

## 2023-06-05 RX ORDER — DEXTROSE 50 % IN WATER 50 %
15 SYRINGE (ML) INTRAVENOUS ONCE
Refills: 0 | Status: DISCONTINUED | OUTPATIENT
Start: 2023-06-05 | End: 2023-06-08

## 2023-06-05 RX ORDER — SENNA PLUS 8.6 MG/1
2 TABLET ORAL AT BEDTIME
Refills: 0 | Status: DISCONTINUED | OUTPATIENT
Start: 2023-06-05 | End: 2023-06-08

## 2023-06-05 RX ORDER — ACETAMINOPHEN 500 MG
975 TABLET ORAL EVERY 8 HOURS
Refills: 0 | Status: DISCONTINUED | OUTPATIENT
Start: 2023-06-06 | End: 2023-06-08

## 2023-06-05 RX ORDER — OXYCODONE HYDROCHLORIDE 5 MG/1
5 TABLET ORAL EVERY 4 HOURS
Refills: 0 | Status: DISCONTINUED | OUTPATIENT
Start: 2023-06-05 | End: 2023-06-08

## 2023-06-05 RX ORDER — KETOROLAC TROMETHAMINE 30 MG/ML
15 SYRINGE (ML) INJECTION EVERY 6 HOURS
Refills: 0 | Status: DISCONTINUED | OUTPATIENT
Start: 2023-06-05 | End: 2023-06-06

## 2023-06-05 RX ORDER — CEFAZOLIN SODIUM 1 G
2000 VIAL (EA) INJECTION EVERY 8 HOURS
Refills: 0 | Status: COMPLETED | OUTPATIENT
Start: 2023-06-05 | End: 2023-06-06

## 2023-06-05 RX ADMIN — ONDANSETRON 4 MILLIGRAM(S): 8 TABLET, FILM COATED ORAL at 18:18

## 2023-06-05 RX ADMIN — PANTOPRAZOLE SODIUM 40 MILLIGRAM(S): 20 TABLET, DELAYED RELEASE ORAL at 07:42

## 2023-06-05 RX ADMIN — Medication 100 MILLIGRAM(S): at 17:40

## 2023-06-05 RX ADMIN — Medication 15 MILLIGRAM(S): at 18:22

## 2023-06-05 RX ADMIN — Medication 2: at 22:57

## 2023-06-05 RX ADMIN — SODIUM CHLORIDE 500 MILLILITER(S): 9 INJECTION, SOLUTION INTRAVENOUS at 14:48

## 2023-06-05 RX ADMIN — TRAMADOL HYDROCHLORIDE 50 MILLIGRAM(S): 50 TABLET ORAL at 07:42

## 2023-06-05 RX ADMIN — OXYCODONE HYDROCHLORIDE 10 MILLIGRAM(S): 5 TABLET ORAL at 15:17

## 2023-06-05 RX ADMIN — OXYCODONE HYDROCHLORIDE 10 MILLIGRAM(S): 5 TABLET ORAL at 16:17

## 2023-06-05 RX ADMIN — Medication 15 MILLIGRAM(S): at 17:22

## 2023-06-05 RX ADMIN — ATORVASTATIN CALCIUM 10 MILLIGRAM(S): 80 TABLET, FILM COATED ORAL at 23:02

## 2023-06-05 RX ADMIN — GABAPENTIN 300 MILLIGRAM(S): 400 CAPSULE ORAL at 23:02

## 2023-06-05 RX ADMIN — Medication 10 MILLIGRAM(S): at 17:22

## 2023-06-05 RX ADMIN — SODIUM CHLORIDE 500 MILLILITER(S): 9 INJECTION, SOLUTION INTRAVENOUS at 12:19

## 2023-06-05 RX ADMIN — CHLORHEXIDINE GLUCONATE 1 APPLICATION(S): 213 SOLUTION TOPICAL at 07:38

## 2023-06-05 RX ADMIN — SENNA PLUS 2 TABLET(S): 8.6 TABLET ORAL at 23:05

## 2023-06-05 RX ADMIN — Medication 3: at 16:46

## 2023-06-05 NOTE — DISCHARGE NOTE PROVIDER - HOSPITAL COURSE
This is a 74yo Female with PMH of DM, HLD, HTN, breast CA s/p mastectomy and OA who presents to [facility] for orthopedic surgery. Patient s/p right TKA with Dr. Weinberg on 6/5/2023. Patient tolerated the procedure well without any intraoperative complications. Patient tolerated physical therapy well, and the pain was controlled. Patient is weight bearing as tolerated with cane/walker as needed. Seen by medical attending for continuity of care and management and cleared for safe discharge. Keep dressing/incision clean, dry and intact. Any suture/staples to be removed on post-op day #14 at your office visit. Patient is on 10mg of xarelto for DVT prophylaxis, please take for 6 weeks unless otherwise instructed by your surgeon. Please follow up with Dr. Weinberg in 2 weeks. Please follow up with your PMD for continuity of care and management as medications may have changed.

## 2023-06-05 NOTE — PATIENT PROFILE ADULT - PATIENT REPRESENTATIVE: ( YOU CAN CHOOSE ANY PERSON THAT CAN ASSIST YOU WITH YOUR HEALTH CARE PREFERENCES, DOES NOT HAVE TO BE A SPOUSE, IMMEDIATE FAMILY OR SIGNIFICANT OTHER/PARTNER)
[FreeTextEntry1] : Feeling ok. Had tooth extraction yesterday, off Eliquis for procedure. \par Some GUTHRIE. Has fatigue. Has gained some weight, likely water as she is out of her furosemide pills.\par She had stopped her metoprolol 2 days ago because she was concerned it would make her bleed during the dental procedure. \par \par Covid vaccinated. \par Still smoking.\par \par  yes

## 2023-06-05 NOTE — DISCHARGE NOTE PROVIDER - NSDCCPCAREPLAN_GEN_ALL_CORE_FT
PRINCIPAL DISCHARGE DIAGNOSIS  Diagnosis: Arthritis of right knee  Assessment and Plan of Treatment: Diet: Continue regular diet upon discharge.   Activity: No heavy lifting > 25 lbs for 4 weeks. Avoid straining or excessive activity x 6 weeks.   -Continue to use your walker when ambulating until your postoperative follow up appointment.   Dressings: Keep dressing clean, dry, and intact. Your doctor will remove your bandage at your post-operative follow up appointment.   Other Care:   -You may shower when you get home but DO NOT soak dressing and/or incision. The water may run over your dressing/incision but DO NOT let the water directly hit your dressing/incision (take a shower with your wound away from the direct stream of water). NO hot tubes, NO bath rubs, NO swimming pools.   -Elevate your operative leg 2 feet above heart level for 2 hours in the morning, 2 hours in the afternoon, and 2 hours in the evening.   -Apply ice for 20min every time you elevate.   -Sit for 90 min/day: 45mins x2 or 30min x3  -DO NOT sit for more than the 90min/day. Walk or lay down when not elevating your leg.   -DO NOT place the elevation pillow behind your knees. Only place it under your calf and heel.   -DO NOT bend more than 45 degrees at the waist

## 2023-06-05 NOTE — PHYSICAL THERAPY INITIAL EVALUATION ADULT - ADDITIONAL COMMENTS
Patient lives with son and daughter in law in private home, 3 steps to enter and flight to bedroom/bathroom. Patient was using a cane outside only. Patient was independent in ADL prior to admission.

## 2023-06-05 NOTE — DISCHARGE NOTE PROVIDER - NSFOLLOWUPCLINICS_GEN_ALL_ED_FT
[facility]  Endocrinology  [address] [place] [address]  Phone: [phone]  Fax:

## 2023-06-05 NOTE — PATIENT PROFILE ADULT - NSPROHMDIABETBLDGLCTARGET_GEN_A_NUR
Fatigue, unspecified type
Nausea
Nausea
Neutropenic fever
known

## 2023-06-05 NOTE — DISCHARGE NOTE PROVIDER - CARE PROVIDER_API CALL
Allan Weinberg  Orthopaedic Surgery  [address] [place] [address], [address]  [place] [address]  Phone: [phone]  Fax: [phone]  Established Patient  Follow Up Time:

## 2023-06-05 NOTE — PACU DISCHARGE NOTE - COMMENTS
T(C): 36.4 (06-05-23 @ 13:00), Max: 36.9 (06-05-23 @ 06:26)  HR: 98 (06-05-23 @ 13:30) (84 - 99)  BP: 126/59 (06-05-23 @ 13:30) (90/58 - 145/54)  RR: 16 (06-05-23 @ 13:30) (11 - 18)  SpO2: 97% (06-05-23 @ 13:30) (96% - 100%)    Vital signs stable, breathing comfortably on room air. Pain and nausea are controlled. Appropriate strength and sensation in bilateral lower extremities. Signs of neuraxial complications to be aware of were discussed with the patient, to which she expressed understanding. All questions answered. Stable for transfer to the floor.

## 2023-06-05 NOTE — DISCHARGE NOTE PROVIDER - NSDCCPTREATMENT_GEN_ALL_CORE_FT
PRINCIPAL PROCEDURE  Procedure: Total knee arthroplasty  Findings and Treatment: Pain control:    Standing:         -Acetaminophen 500mg - 2 tabs every 8 hours  As needed:        -Tramadol 50mg - 1 tab every 6 hours - Take only if needed for MODERATE pain       -oxycodone 5mg - 1 tab every 4-6 hours - Take only if needed for SEVERE or BREAKTHROUGH pain  Oxycodone and Tramadol have been sent to your pharmacy. Please do not drive, operate machinery, or make important decisions while taking these medications.   Other Medications: (Standing)  -Xarelto 10mg daily - to prevent blood clots (for 6 weeks post operatively.)  -Protonix 40mg - 1 tab every 24 hours - to prevent stomach irritation/ulcers  -Senna 8.6mg - 2 pills every 24 hours - stool softener  -Miralax 17g - daily - constipation   Follow up: Please follow up at your prescheduled post-operative follow up appointment with Dr. Weinberg for 2 weeks after hospital discharge. Please call with any questions or concerns including fevers, worsening pain, pus from the wounds, redness of the skin and difficulty breathing or heaviness in the chest at [phone].

## 2023-06-05 NOTE — DISCHARGE NOTE PROVIDER - NSDCMRMEDTOKEN_GEN_ALL_CORE_FT
dicyclomine 10 mg oral capsule: 1 tab(s) orally 3 times a day  gabapentin 300 mg oral capsule: 1 tab(s) orally once a day (at bedtime)  glipiZIDE 5 mg oral tablet: 1 tab(s) orally 2 times a day  Janumet 50 mg-1000 mg oral tablet: 1 tab(s) orally 2 times a day (with meals)  losartan 50 mg oral tablet: 1 tab(s) orally once a day  senna (sennosides) 8.6 mg oral tablet: 1 tab(s) orally once a day (at bedtime)  simvastatin 20 mg oral tablet: 1 tab(s) orally once a day   acetaminophen 325 mg oral tablet: 3 tab(s) orally every 8 hours  celecoxib 200 mg oral capsule: 1 cap(s) orally every 12 hours  dicyclomine 10 mg oral capsule: 1 cap(s) orally 3 times a day (before meals)  gabapentin 300 mg oral capsule: 1 cap(s) orally once a day (at bedtime)  insulin glargine 100 units/mL subcutaneous solution: 12 unit(s) subcutaneous once a day (at bedtime)  insulin lispro 100 units/mL injectable solution: 6 unit(s) injectable every 6 hours before meals  losartan 50 mg oral tablet: 1 tab(s) orally once a day  oxyCODONE 10 mg oral tablet: 1 tab(s) orally every 4 hours As needed Severe Pain (7 - 10)  oxyCODONE 5 mg oral tablet: 1 tab(s) orally every 4 hours As needed Moderate Pain (4 - 6)  pantoprazole 40 mg oral delayed release tablet: 1 tab(s) orally once a day (before a meal)  polyethylene glycol 3350 oral powder for reconstitution: 17 gram(s) orally once a day (at bedtime)  rivaroxaban 10 mg oral tablet: 1 tab(s) orally once a day  Rolling Walker: ALBERT: 99 months  senna leaf extract oral tablet: 2 tab(s) orally once a day (at bedtime)  simvastatin 20 mg oral tablet: 1 tab(s) orally once a day  traMADol 50 mg oral tablet: 1 tab(s) orally every 6 hours As needed Mild Pain (1 - 3)

## 2023-06-06 ENCOUNTER — TRANSCRIPTION ENCOUNTER (OUTPATIENT)
Age: 73
End: 2023-06-06

## 2023-06-06 DIAGNOSIS — I10 ESSENTIAL (PRIMARY) HYPERTENSION: ICD-10-CM

## 2023-06-06 DIAGNOSIS — E11.9 TYPE 2 DIABETES MELLITUS WITHOUT COMPLICATIONS: ICD-10-CM

## 2023-06-06 DIAGNOSIS — M17.11 UNILATERAL PRIMARY OSTEOARTHRITIS, RIGHT KNEE: ICD-10-CM

## 2023-06-06 DIAGNOSIS — E78.5 HYPERLIPIDEMIA, UNSPECIFIED: ICD-10-CM

## 2023-06-06 LAB
ANION GAP SERPL CALC-SCNC: 9 MMOL/L — SIGNIFICANT CHANGE UP (ref 7–14)
BUN SERPL-MCNC: 13 MG/DL — SIGNIFICANT CHANGE UP (ref 7–23)
CALCIUM SERPL-MCNC: 8.7 MG/DL — SIGNIFICANT CHANGE UP (ref 8.4–10.5)
CHLORIDE SERPL-SCNC: 102 MMOL/L — SIGNIFICANT CHANGE UP (ref 98–107)
CO2 SERPL-SCNC: 28 MMOL/L — SIGNIFICANT CHANGE UP (ref 22–31)
CREAT SERPL-MCNC: 0.73 MG/DL — SIGNIFICANT CHANGE UP (ref 0.5–1.3)
EGFR: 88 ML/MIN/1.73M2 — SIGNIFICANT CHANGE UP
GLUCOSE BLDC GLUCOMTR-MCNC: 144 MG/DL — HIGH (ref 70–99)
GLUCOSE BLDC GLUCOMTR-MCNC: 147 MG/DL — HIGH (ref 70–99)
GLUCOSE BLDC GLUCOMTR-MCNC: 253 MG/DL — HIGH (ref 70–99)
GLUCOSE BLDC GLUCOMTR-MCNC: 362 MG/DL — HIGH (ref 70–99)
GLUCOSE SERPL-MCNC: 222 MG/DL — HIGH (ref 70–99)
HCT VFR BLD CALC: 29.4 % — LOW (ref 34.5–45)
HGB BLD-MCNC: 9.3 G/DL — LOW (ref 11.5–15.5)
MCHC RBC-ENTMCNC: 28.2 PG — SIGNIFICANT CHANGE UP (ref 27–34)
MCHC RBC-ENTMCNC: 31.6 GM/DL — LOW (ref 32–36)
MCV RBC AUTO: 89.1 FL — SIGNIFICANT CHANGE UP (ref 80–100)
NRBC # BLD: 0 /100 WBCS — SIGNIFICANT CHANGE UP (ref 0–0)
NRBC # FLD: 0 K/UL — SIGNIFICANT CHANGE UP (ref 0–0)
PLATELET # BLD AUTO: 143 K/UL — LOW (ref 150–400)
POTASSIUM SERPL-MCNC: 4.9 MMOL/L — SIGNIFICANT CHANGE UP (ref 3.5–5.3)
POTASSIUM SERPL-SCNC: 4.9 MMOL/L — SIGNIFICANT CHANGE UP (ref 3.5–5.3)
RBC # BLD: 3.3 M/UL — LOW (ref 3.8–5.2)
RBC # FLD: 12.5 % — SIGNIFICANT CHANGE UP (ref 10.3–14.5)
SARS-COV-2 RNA SPEC QL NAA+PROBE: SIGNIFICANT CHANGE UP
SODIUM SERPL-SCNC: 139 MMOL/L — SIGNIFICANT CHANGE UP (ref 135–145)
WBC # BLD: 10.96 K/UL — HIGH (ref 3.8–10.5)
WBC # FLD AUTO: 10.96 K/UL — HIGH (ref 3.8–10.5)

## 2023-06-06 PROCEDURE — 99223 1ST HOSP IP/OBS HIGH 75: CPT

## 2023-06-06 RX ADMIN — Medication 3: at 11:58

## 2023-06-06 RX ADMIN — Medication 400 MILLIGRAM(S): at 11:55

## 2023-06-06 RX ADMIN — CELECOXIB 200 MILLIGRAM(S): 200 CAPSULE ORAL at 20:08

## 2023-06-06 RX ADMIN — Medication 975 MILLIGRAM(S): at 19:09

## 2023-06-06 RX ADMIN — Medication 1000 MILLIGRAM(S): at 12:30

## 2023-06-06 RX ADMIN — Medication 3: at 22:43

## 2023-06-06 RX ADMIN — PANTOPRAZOLE SODIUM 40 MILLIGRAM(S): 20 TABLET, DELAYED RELEASE ORAL at 07:17

## 2023-06-06 RX ADMIN — Medication 15 MILLIGRAM(S): at 12:15

## 2023-06-06 RX ADMIN — SODIUM CHLORIDE 500 MILLILITER(S): 9 INJECTION, SOLUTION INTRAVENOUS at 05:56

## 2023-06-06 RX ADMIN — Medication 10 MILLIGRAM(S): at 16:49

## 2023-06-06 RX ADMIN — Medication 400 MILLIGRAM(S): at 05:54

## 2023-06-06 RX ADMIN — ATORVASTATIN CALCIUM 10 MILLIGRAM(S): 80 TABLET, FILM COATED ORAL at 21:53

## 2023-06-06 RX ADMIN — Medication 1000 MILLIGRAM(S): at 06:06

## 2023-06-06 RX ADMIN — LOSARTAN POTASSIUM 50 MILLIGRAM(S): 100 TABLET, FILM COATED ORAL at 07:51

## 2023-06-06 RX ADMIN — SENNA PLUS 2 TABLET(S): 8.6 TABLET ORAL at 21:53

## 2023-06-06 RX ADMIN — RIVAROXABAN 10 MILLIGRAM(S): KIT at 11:55

## 2023-06-06 RX ADMIN — Medication 975 MILLIGRAM(S): at 20:09

## 2023-06-06 RX ADMIN — Medication 10 MILLIGRAM(S): at 11:55

## 2023-06-06 RX ADMIN — GABAPENTIN 300 MILLIGRAM(S): 400 CAPSULE ORAL at 21:53

## 2023-06-06 RX ADMIN — Medication 15 MILLIGRAM(S): at 12:00

## 2023-06-06 RX ADMIN — Medication 100 MILLIGRAM(S): at 01:19

## 2023-06-06 RX ADMIN — Medication 10 MILLIGRAM(S): at 07:51

## 2023-06-06 RX ADMIN — CELECOXIB 200 MILLIGRAM(S): 200 CAPSULE ORAL at 19:08

## 2023-06-06 NOTE — DISCHARGE NOTE NURSING/CASE MANAGEMENT/SOCIAL WORK - NSDCPEFALRISK_GEN_ALL_CORE
For information on Fall & Injury Prevention, visit: [email or web address] OR  [email or web address] OR  https://www.cdc.gov/steadi/patient.html

## 2023-06-06 NOTE — CONSULT NOTE ADULT - SUBJECTIVE AND OBJECTIVE BOX
Patient is a 73y old  Female who presents with a chief complaint of s/p right TKA (05 Jun 2023 14:01)      HPI:  73 y.o. female w/ hx HTN, DM2, hyperlipidemia, Breast CA s/p B/L mastectomy, OA of Right knee p/w worsening Right Knee pain now s/p Right TKR on 6/5.           Pain Symptoms if applicable:             	                         none	   mild         moderate         severe  Pain:	                            0	    1-3	     4-6	         7-10  Location:	  Modifying factors:	  Associated symptoms:	    Function: [ ] Independent  [ ] Assistance  [ ] Total care  [ ] Non-ambulatory    Allergies    No Known Allergies    Intolerances        HOME MEDICATIONS: [ x] Reviewed  glipiZIDE 5 mg oral tablet: Last Dose Taken:  , 1 tab(s) orally 2 times a day  simvastatin 20 mg oral tablet: 1 tab(s) orally once a day  dicyclomine 10 mg oral capsule: Last Dose Taken:  , 1 tab(s) orally 3 times a day  senna (sennosides) 8.6 mg oral tablet: 1 tab(s) orally once a day (at bedtime)  gabapentin 300 mg oral capsule: Last Dose Taken:  , 1 tab(s) orally once a day (at bedtime)  losartan 50 mg oral tablet: 1 tab(s) orally once a day  Janumet 50 mg-1000 mg oral tablet: Last Dose Taken:  , 1 tab(s) orally 2 times a day (with meals)    MEDICATIONS  (STANDING):  acetaminophen     Tablet .. 975 milliGRAM(s) Oral every 8 hours  acetaminophen   IVPB .. 1000 milliGRAM(s) IV Intermittent once  atorvastatin 10 milliGRAM(s) Oral at bedtime  celecoxib 200 milliGRAM(s) Oral every 12 hours  dextrose 5%. 1000 milliLiter(s) (100 mL/Hr) IV Continuous <Continuous>  dextrose 5%. 1000 milliLiter(s) (50 mL/Hr) IV Continuous <Continuous>  dextrose 50% Injectable 25 Gram(s) IV Push once  dextrose 50% Injectable 12.5 Gram(s) IV Push once  dextrose 50% Injectable 25 Gram(s) IV Push once  dicyclomine 10 milliGRAM(s) Oral three times a day before meals  gabapentin 300 milliGRAM(s) Oral at bedtime  glucagon  Injectable 1 milliGRAM(s) IntraMuscular once  insulin lispro (ADMELOG) corrective regimen sliding scale   SubCutaneous at bedtime  insulin lispro (ADMELOG) corrective regimen sliding scale   SubCutaneous three times a day before meals  ketorolac   Injectable 15 milliGRAM(s) IV Push every 6 hours  lactated ringers. 1000 milliLiter(s) (30 mL/Hr) IV Continuous <Continuous>  losartan 50 milliGRAM(s) Oral daily  pantoprazole    Tablet 40 milliGRAM(s) Oral before breakfast  polyethylene glycol 3350 17 Gram(s) Oral at bedtime  rivaroxaban 10 milliGRAM(s) Oral daily  senna 2 Tablet(s) Oral at bedtime    MEDICATIONS  (PRN):  dextrose Oral Gel 15 Gram(s) Oral once PRN Blood Glucose LESS THAN 70 milliGRAM(s)/deciliter  ondansetron Injectable 4 milliGRAM(s) IV Push every 6 hours PRN Nausea and/or Vomiting  oxyCODONE    IR 5 milliGRAM(s) Oral every 4 hours PRN Moderate Pain (4 - 6)  oxyCODONE    IR 10 milliGRAM(s) Oral every 4 hours PRN Severe Pain (7 - 10)  traMADol 50 milliGRAM(s) Oral every 6 hours PRN Mild Pain (1 - 3)      PAST MEDICAL & SURGICAL HISTORY:  Diabetes      Breast CA      HLD (hyperlipidemia)      Osteoarthritis      Hypertension      Dry eyes      H/O bilateral mastectomy      [x ] Reviewed     SOCIAL HISTORY:  Residence: [ ] [facility]  [ ] SNF  [ x] Community  [ x] Substance abuse: none  [ x] Tobacco: none  [ x] Alcohol use: none    FAMILY HISTORY:  [x ] No pertinent family history in first degree relatives     REVIEW OF SYSTEMS:    CONSTITUTIONAL: No fever, weight loss, or fatigue  EYES: No eye pain, visual disturbances, or discharge  ENMT:  No difficulty hearing, tinnitus, vertigo; No sinus or throat pain  NECK: No pain or stiffness  BREASTS: No pain, masses, or nipple discharge  RESPIRATORY: No cough, wheezing, chills or hemoptysis; No shortness of breath  CARDIOVASCULAR: No chest pain, palpitations, dizziness, or leg swelling  GASTROINTESTINAL: No abdominal or epigastric pain. No nausea, vomiting, or hematemesis; No diarrhea or constipation. No melena or hematochezia.  GENITOURINARY: No dysuria, frequency, hematuria, or incontinence  NEUROLOGICAL: No headaches, memory loss, loss of strength, numbness, or tremors  SKIN: No itching, burning, rashes, or lesions   LYMPH NODES: No enlarged glands  ENDOCRINE: No heat or cold intolerance; No hair loss  MUSCULOSKELETAL: right knee pain  PSYCHIATRIC: No depression, anxiety, mood swings, or difficulty sleeping  HEME/LYMPH: No easy bruising, or bleeding gums  ALLERGY AND IMMUNOLOGIC: No hives or eczema    [ x ] All other ROS negative  [  ] Unable to obtain due to poor mental status    Vital Signs Last 24 Hrs  T(C): 36.8 (06 Jun 2023 09:51), Max: 36.9 (05 Jun 2023 22:02)  T(F): 98.3 (06 Jun 2023 09:51), Max: 98.4 (05 Jun 2023 22:02)  HR: 81 (06 Jun 2023 09:51) (77 - 99)  BP: 102/57 (06 Jun 2023 09:51) (90/58 - 140/67)  BP(mean): 74 (05 Jun 2023 14:00) (62 - 81)  RR: 18 (06 Jun 2023 09:51) (11 - 18)  SpO2: 99% (06 Jun 2023 09:51) (96% - 100%)    Parameters below as of 06 Jun 2023 09:51  Patient On (Oxygen Delivery Method): room air        PHYSICAL EXAM:    GENERAL: NAD, well-groomed, well-developed  HEAD:  Atraumatic, Normocephalic  EYES: EOMI, PERRLA, conjunctiva and sclera clear  ENMT: Moist mucous membranes  NECK: Supple, No JVD  RESPIRATORY: Clear to auscultation bilaterally; No rales, rhonchi, wheezing, or rubs  CARDIOVASCULAR: Regular rate and rhythm; No murmurs, rubs, or gallops  GASTROINTESTINAL: Soft, Nontender, Nondistended; Bowel sounds present  GENITOURINARY: Not examined  EXTREMITIES:  2+ Peripheral Pulses, No clubbing, cyanosis, or edema  NERVOUS SYSTEM:  Alert & Oriented X3; Moving all 4 extremities; No gross sensory deficits  HEME/LYMPH: No lymphadenopathy noted  SKIN: No rashes or lesions; Incisions C/D/I    LABS:                        9.3    10.96 )-----------( 143      ( 06 Jun 2023 05:37 )             29.4     06-06    139  |  102  |  13  ----------------------------<  222<H>  4.9   |  28  |  0.73    Ca    8.7      06 Jun 2023 05:37          CAPILLARY BLOOD GLUCOSE      POCT Blood Glucose.: 147 mg/dL (06 Jun 2023 07:30)      RADIOLOGY & ADDITIONAL STUDIES:    EKG:   Personally Reviewed:  [ ] YES     Imaging: < from: Xray Knee 1 or 2 Views, Right (06.05.23 @ 11:50) >  IMPRESSION:  Status post right knee arthroplasty. Alignment is intact. There is   postoperative soft tissue air and edema.    < end of copied text >    Personally Reviewed:  [ x] YES               Consultant(s) notes reviewed:    Care Discussed with Consultant(s)/Other Providers: Ortho     Patient is a 73y old  Female who presents with a chief complaint of s/p right TKA (05 Jun 2023 14:01)      HPI:  73 y.o. female w/ hx HTN, DM2, hyperlipidemia, Breast CA s/p B/L mastectomy in 2015, OA of Right knee p/w worsening Right Knee pain for many years not improved with conservative management now s/p Right TKR on 6/5. Patient says her right knee pain is controlled with IV tylenol. Patient has no new complaints. Denies cp, SOB, abdominal pain, N/V/D.           Pain Symptoms if applicable:             	                         none	   mild         moderate         severe  Pain: 4	                            0	    1-3	     4-6	         7-10  Location: Right knee 	  Modifying factors: movement	  Associated symptoms:	    Function: [x ] Independent  [ ] Assistance  [ ] Total care  [ ] Non-ambulatory    Allergies    No Known Allergies    Intolerances        HOME MEDICATIONS: [ x] Reviewed  glipiZIDE 5 mg oral tablet: Last Dose Taken:  , 1 tab(s) orally 2 times a day  simvastatin 20 mg oral tablet: 1 tab(s) orally once a day  dicyclomine 10 mg oral capsule: Last Dose Taken:  , 1 tab(s) orally 3 times a day  senna (sennosides) 8.6 mg oral tablet: 1 tab(s) orally once a day (at bedtime)  gabapentin 300 mg oral capsule: Last Dose Taken:  , 1 tab(s) orally once a day (at bedtime)  losartan 50 mg oral tablet: 1 tab(s) orally once a day  Janumet 50 mg-1000 mg oral tablet: Last Dose Taken:  , 1 tab(s) orally 2 times a day (with meals)    MEDICATIONS  (STANDING):  acetaminophen     Tablet .. 975 milliGRAM(s) Oral every 8 hours  acetaminophen   IVPB .. 1000 milliGRAM(s) IV Intermittent once  atorvastatin 10 milliGRAM(s) Oral at bedtime  celecoxib 200 milliGRAM(s) Oral every 12 hours  dextrose 5%. 1000 milliLiter(s) (100 mL/Hr) IV Continuous <Continuous>  dextrose 5%. 1000 milliLiter(s) (50 mL/Hr) IV Continuous <Continuous>  dextrose 50% Injectable 25 Gram(s) IV Push once  dextrose 50% Injectable 12.5 Gram(s) IV Push once  dextrose 50% Injectable 25 Gram(s) IV Push once  dicyclomine 10 milliGRAM(s) Oral three times a day before meals  gabapentin 300 milliGRAM(s) Oral at bedtime  glucagon  Injectable 1 milliGRAM(s) IntraMuscular once  insulin lispro (ADMELOG) corrective regimen sliding scale   SubCutaneous at bedtime  insulin lispro (ADMELOG) corrective regimen sliding scale   SubCutaneous three times a day before meals  ketorolac   Injectable 15 milliGRAM(s) IV Push every 6 hours  lactated ringers. 1000 milliLiter(s) (30 mL/Hr) IV Continuous <Continuous>  losartan 50 milliGRAM(s) Oral daily  pantoprazole    Tablet 40 milliGRAM(s) Oral before breakfast  polyethylene glycol 3350 17 Gram(s) Oral at bedtime  rivaroxaban 10 milliGRAM(s) Oral daily  senna 2 Tablet(s) Oral at bedtime    MEDICATIONS  (PRN):  dextrose Oral Gel 15 Gram(s) Oral once PRN Blood Glucose LESS THAN 70 milliGRAM(s)/deciliter  ondansetron Injectable 4 milliGRAM(s) IV Push every 6 hours PRN Nausea and/or Vomiting  oxyCODONE    IR 5 milliGRAM(s) Oral every 4 hours PRN Moderate Pain (4 - 6)  oxyCODONE    IR 10 milliGRAM(s) Oral every 4 hours PRN Severe Pain (7 - 10)  traMADol 50 milliGRAM(s) Oral every 6 hours PRN Mild Pain (1 - 3)      PAST MEDICAL & SURGICAL HISTORY:  Diabetes      Breast CA      HLD (hyperlipidemia)      Osteoarthritis      Hypertension      Dry eyes      H/O bilateral mastectomy      [x ] Reviewed     SOCIAL HISTORY:  Residence: [ ] [facility]  [ ] [facility]  [ x] Community  [ x] Substance abuse: none  [ x] Tobacco: none  [ x] Alcohol use: none    FAMILY HISTORY:  [x ] No pertinent family history in first degree relatives     REVIEW OF SYSTEMS:    CONSTITUTIONAL: No fever, weight loss, or fatigue  EYES: No eye pain, visual disturbances, or discharge  ENMT:  No difficulty hearing, tinnitus, vertigo; No sinus or throat pain  NECK: No pain or stiffness  BREASTS: No pain, masses, or nipple discharge  RESPIRATORY: No cough, wheezing, chills or hemoptysis; No shortness of breath  CARDIOVASCULAR: No chest pain, palpitations, dizziness, or leg swelling  GASTROINTESTINAL: No abdominal or epigastric pain. No nausea, vomiting, or hematemesis; No diarrhea or constipation. No melena or hematochezia.  GENITOURINARY: No dysuria, frequency, hematuria, or incontinence  NEUROLOGICAL: No headaches, memory loss, loss of strength, numbness, or tremors  SKIN: No itching, burning, rashes, or lesions   LYMPH NODES: No enlarged glands  ENDOCRINE: No heat or cold intolerance; No hair loss  MUSCULOSKELETAL: right knee pain  PSYCHIATRIC: No depression, anxiety, mood swings, or difficulty sleeping  HEME/LYMPH: No easy bruising, or bleeding gums  ALLERGY AND IMMUNOLOGIC: No hives or eczema    [ x ] All other ROS negative  [  ] Unable to obtain due to poor mental status    Vital Signs Last 24 Hrs  T(C): 36.8 (06 Jun 2023 09:51), Max: 36.9 (05 Jun 2023 22:02)  T(F): 98.3 (06 Jun 2023 09:51), Max: 98.4 (05 Jun 2023 22:02)  HR: 81 (06 Jun 2023 09:51) (77 - 99)  BP: 102/57 (06 Jun 2023 09:51) (90/58 - 140/67)  BP(mean): 74 (05 Jun 2023 14:00) (62 - 81)  RR: 18 (06 Jun 2023 09:51) (11 - 18)  SpO2: 99% (06 Jun 2023 09:51) (96% - 100%)    Parameters below as of 06 Jun 2023 09:51  Patient On (Oxygen Delivery Method): room air        PHYSICAL EXAM:    GENERAL: NAD, well-groomed, well-developed  HEAD:  Atraumatic, Normocephalic  EYES: EOMI, PERRLA, conjunctiva and sclera clear  ENMT: Moist mucous membranes  NECK: Supple, No JVD  RESPIRATORY: Clear to auscultation bilaterally; No rales, rhonchi, wheezing, or rubs  CARDIOVASCULAR: Regular rate and rhythm; No murmurs, rubs, or gallops  GASTROINTESTINAL: Soft, Nontender, Nondistended; Bowel sounds present  GENITOURINARY: Not examined  EXTREMITIES:  Right knee bandage w/ ice pack. 2+ Peripheral Pulses, No clubbing, cyanosis, or edema  NERVOUS SYSTEM:  Alert & Oriented X3; Moving all 4 extremities; No gross sensory deficits  HEME/LYMPH: No lymphadenopathy noted  SKIN: No rashes or lesions; Incisions C/D/I    LABS:                        9.3    10.96 )-----------( 143      ( 06 Jun 2023 05:37 )             29.4     06-06    139  |  102  |  13  ----------------------------<  222<H>  4.9   |  28  |  0.73    Ca    8.7      06 Jun 2023 05:37          CAPILLARY BLOOD GLUCOSE      POCT Blood Glucose.: 147 mg/dL (06 Jun 2023 07:30)      RADIOLOGY & ADDITIONAL STUDIES:    EKG:   Personally Reviewed:  [ ] YES     Imaging: < from: Xray Knee 1 or 2 Views, Right (06.05.23 @ 11:50) >  IMPRESSION:  Status post right knee arthroplasty. Alignment is intact. There is   postoperative soft tissue air and edema.    < end of copied text >    Personally Reviewed:  [ x] YES               Consultant(s) notes reviewed:    Care Discussed with Consultant(s)/Other Providers: Ortho

## 2023-06-06 NOTE — OCCUPATIONAL THERAPY INITIAL EVALUATION ADULT - PERTINENT HX OF CURRENT PROBLEM, REHAB EVAL
Pt is an 73 year old Female status post Right total knee arthroplasty Pt is an 73 year old Female admitted for elective knee sx and is now status post Right total knee arthroplasty.

## 2023-06-06 NOTE — CONSULT NOTE ADULT - PROBLEM SELECTOR RECOMMENDATION 9
s/p Right TKR on 6/5.   management as per ortho  pain controlled w/ Tylenol/ Oxy IR prn  encouraged incentive spirometry and PT  rivaroxabn for DVT ppx  d/c planning as per primary team

## 2023-06-06 NOTE — OCCUPATIONAL THERAPY INITIAL EVALUATION ADULT - PLANNED THERAPY INTERVENTIONS, OT EVAL
ADL retraining/bed mobility training/ROM/strengthening/transfer training ADL retraining/balance training/bed mobility training/ROM/strengthening/transfer training

## 2023-06-06 NOTE — OCCUPATIONAL THERAPY INITIAL EVALUATION ADULT - LIVES WITH, PROFILE
Pt lives in a house with 2 stairs to manage./children Pt lives with family in a house with 2 stairs to manage./children

## 2023-06-06 NOTE — CONSULT NOTE ADULT - ASSESSMENT
73 y.o. female w/ hx HTN, DM2, hyperlipidemia, Breast CA s/p B/L mastectomy in 2015, OA of Right knee p/w worsening Right Knee pain for many years not improved with conservative management now s/p Right TKR on 6/5.

## 2023-06-06 NOTE — DISCHARGE NOTE NURSING/CASE MANAGEMENT/SOCIAL WORK - NSDCPNINST_GEN_ALL_CORE
You have a postop appointment with Dr Weinberg on   Notify Dr Weinberg if you experience any increase in pain not relieved with medication, any redness, drainage or swelling around incision or any fever >100.5.  Drink plenty of fluids.  No heavy lifting or straining.  Continue to use cold therapy, elevate your leg and do exercises as instructed.  Use over the counter stool softeners to asssit with constipation which can be a side effect of narcotic pain medication.  Continue to follow consistent carbohydrate diet and follow up with your PCP for continued management of your diabetes.

## 2023-06-06 NOTE — OCCUPATIONAL THERAPY INITIAL EVALUATION ADULT - IMPAIRED TRANSFERS: SIT/STAND, REHAB EVAL
decreased ROM/decreased strength impaired balance/impaired postural control/decreased ROM/decreased strength

## 2023-06-06 NOTE — DISCHARGE NOTE NURSING/CASE MANAGEMENT/SOCIAL WORK - NSDCPECAREGIVERED_GEN_ALL_CORE
carenotes on knee replacement, exercises worksheet, d/c medications, cold therapyside effects and incision action plan

## 2023-06-06 NOTE — DISCHARGE NOTE NURSING/CASE MANAGEMENT/SOCIAL WORK - PATIENT PORTAL LINK FT
You can access the FollowMyHealth Patient Portal offered by [facility] by registering at the following website: [email or web address]. By joining [facility]’s FollowMyHealth portal, you will also be able to view your health information using other applications (apps) compatible with our system.

## 2023-06-07 LAB
GLUCOSE BLDC GLUCOMTR-MCNC: 142 MG/DL — HIGH (ref 70–99)
GLUCOSE BLDC GLUCOMTR-MCNC: 254 MG/DL — HIGH (ref 70–99)
GLUCOSE BLDC GLUCOMTR-MCNC: 269 MG/DL — HIGH (ref 70–99)
GLUCOSE BLDC GLUCOMTR-MCNC: 471 MG/DL — CRITICAL HIGH (ref 70–99)
GLUCOSE BLDC GLUCOMTR-MCNC: 506 MG/DL — CRITICAL HIGH (ref 70–99)
GLUCOSE BLDC GLUCOMTR-MCNC: 509 MG/DL — CRITICAL HIGH (ref 70–99)

## 2023-06-07 PROCEDURE — 99233 SBSQ HOSP IP/OBS HIGH 50: CPT

## 2023-06-07 RX ADMIN — GABAPENTIN 300 MILLIGRAM(S): 400 CAPSULE ORAL at 21:44

## 2023-06-07 RX ADMIN — PANTOPRAZOLE SODIUM 40 MILLIGRAM(S): 20 TABLET, DELAYED RELEASE ORAL at 06:19

## 2023-06-07 RX ADMIN — OXYCODONE HYDROCHLORIDE 10 MILLIGRAM(S): 5 TABLET ORAL at 21:51

## 2023-06-07 RX ADMIN — OXYCODONE HYDROCHLORIDE 10 MILLIGRAM(S): 5 TABLET ORAL at 22:51

## 2023-06-07 RX ADMIN — CELECOXIB 200 MILLIGRAM(S): 200 CAPSULE ORAL at 16:52

## 2023-06-07 RX ADMIN — CELECOXIB 200 MILLIGRAM(S): 200 CAPSULE ORAL at 17:25

## 2023-06-07 RX ADMIN — ATORVASTATIN CALCIUM 10 MILLIGRAM(S): 80 TABLET, FILM COATED ORAL at 21:51

## 2023-06-07 RX ADMIN — Medication 10 MILLIGRAM(S): at 16:52

## 2023-06-07 RX ADMIN — Medication 975 MILLIGRAM(S): at 03:19

## 2023-06-07 RX ADMIN — Medication 4: at 23:00

## 2023-06-07 RX ADMIN — SENNA PLUS 2 TABLET(S): 8.6 TABLET ORAL at 21:52

## 2023-06-07 RX ADMIN — CELECOXIB 200 MILLIGRAM(S): 200 CAPSULE ORAL at 06:19

## 2023-06-07 RX ADMIN — Medication 975 MILLIGRAM(S): at 12:52

## 2023-06-07 RX ADMIN — CELECOXIB 200 MILLIGRAM(S): 200 CAPSULE ORAL at 06:36

## 2023-06-07 RX ADMIN — Medication 3: at 11:44

## 2023-06-07 RX ADMIN — Medication 975 MILLIGRAM(S): at 04:02

## 2023-06-07 RX ADMIN — Medication 10 MILLIGRAM(S): at 06:19

## 2023-06-07 RX ADMIN — LOSARTAN POTASSIUM 50 MILLIGRAM(S): 100 TABLET, FILM COATED ORAL at 08:37

## 2023-06-07 RX ADMIN — Medication 3: at 16:51

## 2023-06-07 RX ADMIN — Medication 10 MILLIGRAM(S): at 12:52

## 2023-06-07 RX ADMIN — RIVAROXABAN 10 MILLIGRAM(S): KIT at 12:52

## 2023-06-07 RX ADMIN — Medication 975 MILLIGRAM(S): at 13:52

## 2023-06-07 NOTE — PROGRESS NOTE ADULT - ATTENDING COMMENTS
Patient seen and examined. Miracle Latif is a 73 year old female now status post right total knee arthroplasty. Patient is currently doing well. She had some pain overnight and following PT yesterday. Patient was able to ambulate with PT yesterday. Patient is currently requesting rehab.    Physical Exam:  Dressing: Clean, Dry, Intact  Motor: Intact EHL/FHL/Tibialis Anterior/Gastrocnemius  Sensory: Intact Superficial Peroneal/Deep Peroneal/Saphenous/Sural/Tibial Nerves  Vascular: 2+ DP Pulse    Assessment/Plan:  Miracle Latif is a 73 year old female now status post right total knee arthroplasty    Plan:  -Right Lower Extremity: Weight Bearing as Tolerated  -PT/OT, Out of Bed  -DVT Prophylaxis: Xarelto 10mg daily  -Antibiotics: Ancef 2g x24 hours  -Follow up Labs  -Follow up Medicine  -Disposition: Possible Rehab pending insurance authorization
Samm  ID: [id number]    Patient seen and examined. Miracle Latif is a 73 year old female now status post right total knee arthroplasty. Patient is currently doing well. She does not have significant pain at this time. Patient is currently requesting rehab.    Physical Exam:  Dressing: Clean, Dry, Intact  Motor: Intact EHL/FHL/Tibialis Anterior/Gastrocnemius  Sensory: Intact Superficial Peroneal/Deep Peroneal/Saphenous/Sural/Tibial Nerves  Vascular: 2+ DP Pulse    Assessment/Plan:  Miracle Latif is a 73 year old female now status post right total knee arthroplasty    Plan:  -Right Lower Extremity: Weight Bearing as Tolerated  -PT/OT, Out of Bed  -DVT Prophylaxis: Xarelto 10mg daily  -Antibiotics: Ancef 2g x24 hours  -Follow up Labs  -Follow up Medicine  -Disposition: Rehab

## 2023-06-07 NOTE — PROVIDER CONTACT NOTE (OTHER) - ASSESSMENT
Pt is in bed A&Ox4, Chinese speaking. Pt denies the presence of blurred vision, increased urination, weakness or any additional signs of hyperglycemia.

## 2023-06-08 VITALS
RESPIRATION RATE: 18 BRPM | SYSTOLIC BLOOD PRESSURE: 108 MMHG | TEMPERATURE: 98 F | DIASTOLIC BLOOD PRESSURE: 45 MMHG | HEART RATE: 69 BPM | OXYGEN SATURATION: 100 %

## 2023-06-08 LAB
ANION GAP SERPL CALC-SCNC: 8 MMOL/L — SIGNIFICANT CHANGE UP (ref 7–14)
BUN SERPL-MCNC: 11 MG/DL — SIGNIFICANT CHANGE UP (ref 7–23)
CALCIUM SERPL-MCNC: 8.8 MG/DL — SIGNIFICANT CHANGE UP (ref 8.4–10.5)
CHLORIDE SERPL-SCNC: 101 MMOL/L — SIGNIFICANT CHANGE UP (ref 98–107)
CO2 SERPL-SCNC: 27 MMOL/L — SIGNIFICANT CHANGE UP (ref 22–31)
CREAT SERPL-MCNC: 0.7 MG/DL — SIGNIFICANT CHANGE UP (ref 0.5–1.3)
EGFR: 92 ML/MIN/1.73M2 — SIGNIFICANT CHANGE UP
GLUCOSE BLDC GLUCOMTR-MCNC: 202 MG/DL — HIGH (ref 70–99)
GLUCOSE BLDC GLUCOMTR-MCNC: 210 MG/DL — HIGH (ref 70–99)
GLUCOSE BLDC GLUCOMTR-MCNC: 280 MG/DL — HIGH (ref 70–99)
GLUCOSE BLDC GLUCOMTR-MCNC: 341 MG/DL — HIGH (ref 70–99)
GLUCOSE SERPL-MCNC: 315 MG/DL — HIGH (ref 70–99)
HCT VFR BLD CALC: 28.2 % — LOW (ref 34.5–45)
HGB BLD-MCNC: 8.8 G/DL — LOW (ref 11.5–15.5)
MCHC RBC-ENTMCNC: 27.8 PG — SIGNIFICANT CHANGE UP (ref 27–34)
MCHC RBC-ENTMCNC: 31.2 GM/DL — LOW (ref 32–36)
MCV RBC AUTO: 89.2 FL — SIGNIFICANT CHANGE UP (ref 80–100)
NRBC # BLD: 0 /100 WBCS — SIGNIFICANT CHANGE UP (ref 0–0)
NRBC # FLD: 0 K/UL — SIGNIFICANT CHANGE UP (ref 0–0)
PLATELET # BLD AUTO: 133 K/UL — LOW (ref 150–400)
POTASSIUM SERPL-MCNC: 4.4 MMOL/L — SIGNIFICANT CHANGE UP (ref 3.5–5.3)
POTASSIUM SERPL-SCNC: 4.4 MMOL/L — SIGNIFICANT CHANGE UP (ref 3.5–5.3)
RBC # BLD: 3.16 M/UL — LOW (ref 3.8–5.2)
RBC # FLD: 12.9 % — SIGNIFICANT CHANGE UP (ref 10.3–14.5)
SODIUM SERPL-SCNC: 136 MMOL/L — SIGNIFICANT CHANGE UP (ref 135–145)
WBC # BLD: 8.47 K/UL — SIGNIFICANT CHANGE UP (ref 3.8–10.5)
WBC # FLD AUTO: 8.47 K/UL — SIGNIFICANT CHANGE UP (ref 3.8–10.5)

## 2023-06-08 PROCEDURE — 99232 SBSQ HOSP IP/OBS MODERATE 35: CPT

## 2023-06-08 RX ORDER — RIVAROXABAN 15 MG-20MG
1 KIT ORAL
Qty: 0 | Refills: 0 | DISCHARGE
Start: 2023-06-08

## 2023-06-08 RX ORDER — DEXTROSE 50 % IN WATER 50 %
25 SYRINGE (ML) INTRAVENOUS ONCE
Refills: 0 | Status: DISCONTINUED | OUTPATIENT
Start: 2023-06-08 | End: 2023-06-08

## 2023-06-08 RX ORDER — POLYETHYLENE GLYCOL 3350 17 G/17G
17 POWDER, FOR SOLUTION ORAL
Qty: 0 | Refills: 0 | DISCHARGE
Start: 2023-06-08

## 2023-06-08 RX ORDER — SODIUM CHLORIDE 9 MG/ML
1000 INJECTION, SOLUTION INTRAVENOUS
Refills: 0 | Status: DISCONTINUED | OUTPATIENT
Start: 2023-06-08 | End: 2023-06-08

## 2023-06-08 RX ORDER — CELECOXIB 200 MG/1
1 CAPSULE ORAL
Qty: 0 | Refills: 0 | DISCHARGE
Start: 2023-06-08

## 2023-06-08 RX ORDER — INSULIN LISPRO 100/ML
6 VIAL (ML) SUBCUTANEOUS EVERY 6 HOURS
Refills: 0 | Status: DISCONTINUED | OUTPATIENT
Start: 2023-06-08 | End: 2023-06-08

## 2023-06-08 RX ORDER — PANTOPRAZOLE SODIUM 20 MG/1
1 TABLET, DELAYED RELEASE ORAL
Qty: 0 | Refills: 0 | DISCHARGE
Start: 2023-06-08

## 2023-06-08 RX ORDER — INSULIN GLARGINE 100 [IU]/ML
12 INJECTION, SOLUTION SUBCUTANEOUS AT BEDTIME
Refills: 0 | Status: DISCONTINUED | OUTPATIENT
Start: 2023-06-08 | End: 2023-06-08

## 2023-06-08 RX ORDER — OXYCODONE HYDROCHLORIDE 5 MG/1
1 TABLET ORAL
Qty: 0 | Refills: 0 | DISCHARGE
Start: 2023-06-08

## 2023-06-08 RX ORDER — GLUCAGON INJECTION, SOLUTION 0.5 MG/.1ML
1 INJECTION, SOLUTION SUBCUTANEOUS ONCE
Refills: 0 | Status: DISCONTINUED | OUTPATIENT
Start: 2023-06-08 | End: 2023-06-08

## 2023-06-08 RX ORDER — INSULIN GLARGINE 100 [IU]/ML
12 INJECTION, SOLUTION SUBCUTANEOUS
Qty: 0 | Refills: 0 | DISCHARGE
Start: 2023-06-08

## 2023-06-08 RX ORDER — ACETAMINOPHEN 500 MG
3 TABLET ORAL
Qty: 0 | Refills: 0 | DISCHARGE
Start: 2023-06-08

## 2023-06-08 RX ORDER — GABAPENTIN 400 MG/1
1 CAPSULE ORAL
Qty: 0 | Refills: 0 | DISCHARGE
Start: 2023-06-08

## 2023-06-08 RX ORDER — INSULIN LISPRO 100/ML
6 VIAL (ML) SUBCUTANEOUS
Refills: 0 | Status: DISCONTINUED | OUTPATIENT
Start: 2023-06-08 | End: 2023-06-08

## 2023-06-08 RX ORDER — SENNA PLUS 8.6 MG/1
2 TABLET ORAL
Qty: 0 | Refills: 0 | DISCHARGE
Start: 2023-06-08

## 2023-06-08 RX ORDER — ONDANSETRON 8 MG/1
4 TABLET, FILM COATED ORAL EVERY 6 HOURS
Refills: 0 | Status: DISCONTINUED | OUTPATIENT
Start: 2023-06-08 | End: 2023-06-08

## 2023-06-08 RX ORDER — SENNA PLUS 8.6 MG/1
1 TABLET ORAL
Refills: 0 | DISCHARGE

## 2023-06-08 RX ORDER — INSULIN LISPRO 100/ML
6 VIAL (ML) SUBCUTANEOUS
Qty: 0 | Refills: 0 | DISCHARGE
Start: 2023-06-08

## 2023-06-08 RX ORDER — TRAMADOL HYDROCHLORIDE 50 MG/1
1 TABLET ORAL
Qty: 0 | Refills: 0 | DISCHARGE
Start: 2023-06-08

## 2023-06-08 RX ORDER — SITAGLIPTIN AND METFORMIN HYDROCHLORIDE 500; 50 MG/1; MG/1
1 TABLET, FILM COATED ORAL
Refills: 0 | DISCHARGE

## 2023-06-08 RX ORDER — GABAPENTIN 400 MG/1
1 CAPSULE ORAL
Refills: 0 | DISCHARGE

## 2023-06-08 RX ORDER — DEXTROSE 50 % IN WATER 50 %
12.5 SYRINGE (ML) INTRAVENOUS ONCE
Refills: 0 | Status: DISCONTINUED | OUTPATIENT
Start: 2023-06-08 | End: 2023-06-08

## 2023-06-08 RX ADMIN — Medication 2: at 16:12

## 2023-06-08 RX ADMIN — OXYCODONE HYDROCHLORIDE 10 MILLIGRAM(S): 5 TABLET ORAL at 03:34

## 2023-06-08 RX ADMIN — OXYCODONE HYDROCHLORIDE 10 MILLIGRAM(S): 5 TABLET ORAL at 04:13

## 2023-06-08 RX ADMIN — ONDANSETRON 4 MILLIGRAM(S): 8 TABLET, FILM COATED ORAL at 08:22

## 2023-06-08 RX ADMIN — Medication 6 UNIT(S): at 11:34

## 2023-06-08 RX ADMIN — Medication 4: at 11:34

## 2023-06-08 RX ADMIN — RIVAROXABAN 10 MILLIGRAM(S): KIT at 11:34

## 2023-06-08 RX ADMIN — Medication 975 MILLIGRAM(S): at 12:35

## 2023-06-08 RX ADMIN — LOSARTAN POTASSIUM 50 MILLIGRAM(S): 100 TABLET, FILM COATED ORAL at 05:48

## 2023-06-08 RX ADMIN — Medication 2: at 07:34

## 2023-06-08 RX ADMIN — Medication 10 MILLIGRAM(S): at 11:34

## 2023-06-08 RX ADMIN — Medication 975 MILLIGRAM(S): at 11:35

## 2023-06-08 RX ADMIN — CELECOXIB 200 MILLIGRAM(S): 200 CAPSULE ORAL at 06:46

## 2023-06-08 RX ADMIN — CELECOXIB 200 MILLIGRAM(S): 200 CAPSULE ORAL at 05:48

## 2023-06-08 RX ADMIN — PANTOPRAZOLE SODIUM 40 MILLIGRAM(S): 20 TABLET, DELAYED RELEASE ORAL at 05:48

## 2023-06-08 NOTE — PROGRESS NOTE ADULT - ASSESSMENT
A/P: 73y y/o Female s/p R total knee arthroplasty, POD #0  Samm  Used [facility] #[id number]    - Pain control  - Antibiotic - Ancef postop  - Incentive Spirometry  - DVT prophylaxis: Venodynes/Xarelto 10mg   - F/U AM Labs  - PT/OT/WBAT  - Notify Orthopedics with any questions
73 y.o. female w/ hx HTN, DM2, hyperlipidemia, Breast CA s/p B/L mastectomy in 2015, OA of Right knee p/w worsening Right Knee pain for many years not improved with conservative management now s/p Right TKR on 6/5. 
73 y.o. female w/ hx HTN, DM2, hyperlipidemia, Breast CA s/p B/L mastectomy in 2015, OA of Right knee p/w worsening Right Knee pain for many years not improved with conservative management now s/p Right TKR on 6/5.

## 2023-06-08 NOTE — PROGRESS NOTE ADULT - PROBLEM SELECTOR PLAN 1
s/p Right TKR on 6/5.   management as per ortho  pain controlled w/ Tylenol/ Oxy IR prn  encouraged incentive spirometry and PT  rivaroxabn for DVT ppx  d/c planning as per primary team.
s/p Right TKR on 6/5.   management as per ortho  pain controlled w/ Tylenol/ Oxy IR prn  encouraged incentive spirometry and PT  rivaroxabn for DVT ppx  d/c planning as per primary team.

## 2023-06-08 NOTE — PROGRESS NOTE ADULT - SUBJECTIVE AND OBJECTIVE BOX
Ortho Progress Note    S: Patient seen and examined this AM. No acute events overnight. Pain well controlled with current regimen. Denies lightheadedness/dizziness, CP/SOB. Tolerating diet. Has been getting OOB with walker.      O:  Physical Exam:  Gen: Laying in bed, NAD, alert and oriented.   Resp: Unlabored breathing  Ext: EHL/FHL/TA/Sol intact          + SILT DP/SP/FELIZ/Sa/Tib          +DP, extremity WWP  Dressing c/d/i    ICU Vital Signs Last 24 Hrs  T(C): 36.9 (06 Jun 2023 22:38), Max: 37 (06 Jun 2023 14:00)  T(F): 98.4 (06 Jun 2023 22:38), Max: 98.6 (06 Jun 2023 14:00)  HR: 92 (06 Jun 2023 22:38) (77 - 92)  BP: 133/53 (06 Jun 2023 22:38) (102/57 - 133/53)  BP(mean): --  ABP: --  ABP(mean): --  RR: 18 (06 Jun 2023 22:38) (16 - 18)  SpO2: 97% (06 Jun 2023 22:38) (97% - 100%)    O2 Parameters below as of 06 Jun 2023 22:38  Patient On (Oxygen Delivery Method): room air      Assessment: 73y y/o Female s/p R total knee arthroplasty, progressing well.    Plan:  - Pain control  - Incentive Spirometry  - DVT prophylaxis: Venodynes/Xarelto 10mg   - PT/OT/WBAT  - Dispo: Likely rehab today      
Ortho Progress Note    S: Patient seen and examined. No acute events overnight. Pain well controlled with current regimen. Denies lightheadedness/dizziness, CP/SOB. Tolerating diet. Has gotten OOB.      O:  Physical Exam:  Gen: Laying in bed, NAD, alert and oriented.   Resp: Unlabored breathing  Ext: EHL/FHL/TA/Sol intact          + SILT DP/SP/FELIZ/Sa/Tib          +DP, extremity WWP  Dressing c/d/i    Vital Signs Last 24 Hrs  T(C): 36.7 (06 Jun 2023 01:48), Max: 36.9 (05 Jun 2023 06:26)  T(F): 98.1 (06 Jun 2023 01:48), Max: 98.4 (05 Jun 2023 06:26)  HR: 91 (06 Jun 2023 01:48) (84 - 99)  BP: 119/59 (06 Jun 2023 01:48) (90/58 - 145/54)  BP(mean): 74 (05 Jun 2023 14:00) (62 - 81)  RR: 17 (06 Jun 2023 01:48) (11 - 18)  SpO2: 96% (06 Jun 2023 01:48) (96% - 100%)    Parameters below as of 06 Jun 2023 01:48  Patient On (Oxygen Delivery Method): room air      Assessment: 73y y/o Female s/p R total knee arthroplasty, progressing well.    Plan:  - Pain control  - Antibiotic - Ancef postop  - Incentive Spirometry  - DVT prophylaxis: Venodynes/Xarelto 10mg   - F/U AM Labs  - PT/OT/WBAT              
Orthopedics Post-Op Check:  Patient was seen and examined at bedside. Denies CP/SOB/Dizziness/N/V/D/HA. Pain is well controlled at the moment.    Vital Signs Last 24 Hrs  T(C): 36.4 (05 Jun 2023 13:00), Max: 36.9 (05 Jun 2023 06:26)  T(F): 97.5 (05 Jun 2023 13:00), Max: 98.4 (05 Jun 2023 06:26)  HR: 96 (05 Jun 2023 13:45) (84 - 99)  BP: 125/58 (05 Jun 2023 13:45) (90/58 - 145/54)  BP(mean): 75 (05 Jun 2023 13:45) (62 - 81)  RR: 16 (05 Jun 2023 13:45) (11 - 18)  SpO2: 98% (05 Jun 2023 13:45) (96% - 100%)    Parameters below as of 05 Jun 2023 13:00  Patient On (Oxygen Delivery Method): room air    Labs: FU AM    Physical Exam:  Gen: NAD  R LE:   Dressing C/D/I  Motor intact + EHL/FHL/TA/GS. Sensation is grossly intact.   Compartments are soft, extremities are warm, DP 2+        
Patient seen and evaluated this AM.  Nauseated and thew up after taking pills this AM.  Has not been eating / drinking too much either.  Otherwise feels well. Has been OOB.  Pain controlled.    ICU Vital Signs Last 24 Hrs  T(C): 37.2 (08 Jun 2023 05:37), Max: 37.3 (07 Jun 2023 18:05)  T(F): 98.9 (08 Jun 2023 05:37), Max: 99.2 (07 Jun 2023 18:05)  HR: 100 (08 Jun 2023 05:37) (81 - 100)  BP: 120/58 (08 Jun 2023 05:37) (113/61 - 154/45)  BP(mean): --  ABP: --  ABP(mean): --  RR: 18 (08 Jun 2023 05:37) (18 - 18)  SpO2: 97% (08 Jun 2023 05:37) (97% - 98%)    O2 Parameters below as of 08 Jun 2023 05:37  Patient On (Oxygen Delivery Method): room air      Physical Exam:  Gen: Laying in bed, NAD, alert and oriented.   Resp: Unlabored breathing  Ext: EHL/FHL/TA/Sol intact          + SILT DP/SP/FELIZ/Sa/Tib          +DP, extremity WWP  Dressing c/d/i    Assessment: 73y y/o Female s/p R total knee arthroplasty, progressing well.    Plan:  - Pain control  - Incentive Spirometry  - DVT prophylaxis: Venodynes/Xarelto 10mg   - PT/OT/WBAT  - Dispo: Rehab 
Patient is a 73y old  Female who presents with a chief complaint of Right TKR (06 Jun 2023 10:34)      SUBJECTIVE / OVERNIGHT EVENTS:  Patient's Blood sugars were elevated overnight because she was eating things her family brought to the hospital. FS now improved with insulin. Patient c/o vomiting overnight now resolved. Patient has no new complaints. Denies cp, SOB, abdominal pain, or Diarrhea     MEDICATIONS  (STANDING):  acetaminophen     Tablet .. 975 milliGRAM(s) Oral every 8 hours  atorvastatin 10 milliGRAM(s) Oral at bedtime  celecoxib 200 milliGRAM(s) Oral every 12 hours  dextrose 5%. 1000 milliLiter(s) (50 mL/Hr) IV Continuous <Continuous>  dextrose 5%. 1000 milliLiter(s) (100 mL/Hr) IV Continuous <Continuous>  dextrose 5%. 1000 milliLiter(s) (50 mL/Hr) IV Continuous <Continuous>  dextrose 5%. 1000 milliLiter(s) (100 mL/Hr) IV Continuous <Continuous>  dextrose 50% Injectable 25 Gram(s) IV Push once  dextrose 50% Injectable 25 Gram(s) IV Push once  dextrose 50% Injectable 25 Gram(s) IV Push once  dextrose 50% Injectable 25 Gram(s) IV Push once  dextrose 50% Injectable 12.5 Gram(s) IV Push once  dextrose 50% Injectable 12.5 Gram(s) IV Push once  dicyclomine 10 milliGRAM(s) Oral three times a day before meals  gabapentin 300 milliGRAM(s) Oral at bedtime  glucagon  Injectable 1 milliGRAM(s) IntraMuscular once  glucagon  Injectable 1 milliGRAM(s) IntraMuscular once  insulin glargine Injectable (LANTUS) 12 Unit(s) SubCutaneous at bedtime  insulin lispro (ADMELOG) corrective regimen sliding scale   SubCutaneous at bedtime  insulin lispro (ADMELOG) corrective regimen sliding scale   SubCutaneous three times a day before meals  insulin lispro Injectable (ADMELOG) 6 Unit(s) SubCutaneous every 6 hours  losartan 50 milliGRAM(s) Oral daily  pantoprazole    Tablet 40 milliGRAM(s) Oral before breakfast  polyethylene glycol 3350 17 Gram(s) Oral at bedtime  rivaroxaban 10 milliGRAM(s) Oral daily  senna 2 Tablet(s) Oral at bedtime    MEDICATIONS  (PRN):  bisacodyl Suppository 10 milliGRAM(s) Rectal once PRN Constipation  dextrose Oral Gel 15 Gram(s) Oral once PRN Blood Glucose LESS THAN 70 milliGRAM(s)/deciliter  ondansetron Injectable 4 milliGRAM(s) IV Push every 6 hours PRN Nausea and/or Vomiting  oxyCODONE    IR 5 milliGRAM(s) Oral every 4 hours PRN Moderate Pain (4 - 6)  oxyCODONE    IR 10 milliGRAM(s) Oral every 4 hours PRN Severe Pain (7 - 10)  traMADol 50 milliGRAM(s) Oral every 6 hours PRN Mild Pain (1 - 3)      Vital Signs Last 24 Hrs  T(C): 36.7 (08 Jun 2023 09:28), Max: 37.3 (07 Jun 2023 18:05)  T(F): 98 (08 Jun 2023 09:28), Max: 99.2 (07 Jun 2023 18:05)  HR: 91 (08 Jun 2023 09:28) (84 - 100)  BP: 117/51 (08 Jun 2023 09:28) (113/61 - 154/45)  BP(mean): --  RR: 18 (08 Jun 2023 09:28) (18 - 18)  SpO2: 98% (08 Jun 2023 09:28) (97% - 98%)    Parameters below as of 08 Jun 2023 09:28  Patient On (Oxygen Delivery Method): room air      CAPILLARY BLOOD GLUCOSE      POCT Blood Glucose.: 210 mg/dL (08 Jun 2023 07:17)  POCT Blood Glucose.: 280 mg/dL (08 Jun 2023 00:58)  POCT Blood Glucose.: 509 mg/dL (07 Jun 2023 23:14)  POCT Blood Glucose.: 471 mg/dL (07 Jun 2023 22:54)  POCT Blood Glucose.: 506 mg/dL (07 Jun 2023 22:50)  POCT Blood Glucose.: 269 mg/dL (07 Jun 2023 16:42)  POCT Blood Glucose.: 254 mg/dL (07 Jun 2023 11:28)    I&O's Summary    07 Jun 2023 07:01  -  08 Jun 2023 07:00  --------------------------------------------------------  IN: 0 mL / OUT: 1000 mL / NET: -1000 mL        PHYSICAL EXAM:  GENERAL: NAD, well-developed  HEAD:  Atraumatic, Normocephalic  EYES: EOMI, PERRLA, conjunctiva and sclera clear  NECK: Supple, No JVD  CHEST/LUNG: Clear to auscultation bilaterally; No wheeze  HEART: Regular rate and rhythm; No murmurs, rubs, or gallops  ABDOMEN: Soft, Nontender, Nondistended; Bowel sounds present  EXTREMITIES:  2+ Peripheral Pulses, No clubbing, cyanosis, or edema  PSYCH: AAOx3  NEUROLOGY: non-focal  SKIN: No rashes or lesions    LABS:                        8.8    8.47  )-----------( 133      ( 08 Jun 2023 09:56 )             28.2     06-08    136  |  101  |  11  ----------------------------<  315<H>  4.4   |  27  |  0.70    Ca    8.8      08 Jun 2023 09:56                RADIOLOGY & ADDITIONAL TESTS:    Imaging Personally Reviewed:    Consultant(s) Notes Reviewed:      Care Discussed with Consultants/Other Providers:  
Patient is a 73y old  Female who presents with a chief complaint of Right TKR (06 Jun 2023 10:34)      SUBJECTIVE / OVERNIGHT EVENTS:  Patient has no new complaints. Denies cp, SOB, abdominal pain, N/V/D     MEDICATIONS  (STANDING):  acetaminophen     Tablet .. 975 milliGRAM(s) Oral every 8 hours  atorvastatin 10 milliGRAM(s) Oral at bedtime  celecoxib 200 milliGRAM(s) Oral every 12 hours  dextrose 5%. 1000 milliLiter(s) (50 mL/Hr) IV Continuous <Continuous>  dextrose 5%. 1000 milliLiter(s) (100 mL/Hr) IV Continuous <Continuous>  dextrose 50% Injectable 25 Gram(s) IV Push once  dextrose 50% Injectable 12.5 Gram(s) IV Push once  dextrose 50% Injectable 25 Gram(s) IV Push once  dicyclomine 10 milliGRAM(s) Oral three times a day before meals  gabapentin 300 milliGRAM(s) Oral at bedtime  glucagon  Injectable 1 milliGRAM(s) IntraMuscular once  insulin lispro (ADMELOG) corrective regimen sliding scale   SubCutaneous at bedtime  insulin lispro (ADMELOG) corrective regimen sliding scale   SubCutaneous three times a day before meals  losartan 50 milliGRAM(s) Oral daily  pantoprazole    Tablet 40 milliGRAM(s) Oral before breakfast  polyethylene glycol 3350 17 Gram(s) Oral at bedtime  rivaroxaban 10 milliGRAM(s) Oral daily  senna 2 Tablet(s) Oral at bedtime    MEDICATIONS  (PRN):  bisacodyl Suppository 10 milliGRAM(s) Rectal once PRN Constipation  dextrose Oral Gel 15 Gram(s) Oral once PRN Blood Glucose LESS THAN 70 milliGRAM(s)/deciliter  ondansetron Injectable 4 milliGRAM(s) IV Push every 6 hours PRN Nausea and/or Vomiting  oxyCODONE    IR 5 milliGRAM(s) Oral every 4 hours PRN Moderate Pain (4 - 6)  oxyCODONE    IR 10 milliGRAM(s) Oral every 4 hours PRN Severe Pain (7 - 10)  traMADol 50 milliGRAM(s) Oral every 6 hours PRN Mild Pain (1 - 3)      Vital Signs Last 24 Hrs  T(C): 37.2 (07 Jun 2023 09:28), Max: 37.2 (07 Jun 2023 09:28)  T(F): 98.9 (07 Jun 2023 09:28), Max: 98.9 (07 Jun 2023 09:28)  HR: 81 (07 Jun 2023 09:28) (80 - 92)  BP: 124/50 (07 Jun 2023 09:28) (118/50 - 133/53)  BP(mean): --  RR: 18 (07 Jun 2023 09:28) (16 - 18)  SpO2: 97% (07 Jun 2023 09:28) (97% - 100%)    Parameters below as of 07 Jun 2023 09:28  Patient On (Oxygen Delivery Method): room air      CAPILLARY BLOOD GLUCOSE      POCT Blood Glucose.: 254 mg/dL (07 Jun 2023 11:28)  POCT Blood Glucose.: 142 mg/dL (07 Jun 2023 07:17)  POCT Blood Glucose.: 362 mg/dL (06 Jun 2023 22:22)  POCT Blood Glucose.: 144 mg/dL (06 Jun 2023 16:21)    I&O's Summary    06 Jun 2023 07:01  -  07 Jun 2023 07:00  --------------------------------------------------------  IN: 0 mL / OUT: 600 mL / NET: -600 mL    07 Jun 2023 07:01  -  07 Jun 2023 13:18  --------------------------------------------------------  IN: 0 mL / OUT: 600 mL / NET: -600 mL        PHYSICAL EXAM:  GENERAL: NAD, well-developed  HEAD:  Atraumatic, Normocephalic  EYES: EOMI, PERRLA, conjunctiva and sclera clear  NECK: Supple, No JVD  CHEST/LUNG: Clear to auscultation bilaterally; No wheeze  HEART: Regular rate and rhythm; No murmurs, rubs, or gallops  ABDOMEN: Soft, Nontender, Nondistended; Bowel sounds present  EXTREMITIES:  2+ Peripheral Pulses, No clubbing, cyanosis, or edema  PSYCH: AAOx3  NEUROLOGY: non-focal  SKIN: No rashes or lesions    LABS:                        9.3    10.96 )-----------( 143      ( 06 Jun 2023 05:37 )             29.4     06-06    139  |  102  |  13  ----------------------------<  222<H>  4.9   |  28  |  0.73    Ca    8.7      06 Jun 2023 05:37                RADIOLOGY & ADDITIONAL TESTS:    Imaging Personally Reviewed:    Consultant(s) Notes Reviewed:      Care Discussed with Consultants/Other Providers:

## 2023-06-08 NOTE — PROGRESS NOTE ADULT - PROBLEM SELECTOR PLAN 2
FS elevated overnight due to diet noncompliance  BMP WNL. no AG gap.   Patient educated to maintain a diabetic diet  c/w Insulin sliding scale  Start admelog 6 Units qAC and Lantus 12 Units qHs  Can restart home glipizide and januvia at rehab in 24 -48 hours
FS stable  diabetic diet  c/w Insulin sliding scale  restart home glipizide and januvia on discharge.

## 2023-06-20 ENCOUNTER — APPOINTMENT (OUTPATIENT)
Dept: ORTHOPEDIC SURGERY | Facility: CLINIC | Age: 73
End: 2023-06-20

## 2023-06-20 ENCOUNTER — APPOINTMENT (OUTPATIENT)
Dept: ORTHOPEDIC SURGERY | Facility: CLINIC | Age: 73
End: 2023-06-20
Payer: MEDICAID

## 2023-06-20 PROCEDURE — 99024 POSTOP FOLLOW-UP VISIT: CPT

## 2023-06-20 PROCEDURE — 73562 X-RAY EXAM OF KNEE 3: CPT | Mod: RT

## 2023-06-20 NOTE — DISCUSSION/SUMMARY
[de-identified] : Miracle Latif is a 73-year-old female presents to the office for follow-up of her right total knee arthroplasty.  Patient underwent right TKA on 6/5/2023.  She is currently doing well.  X-rays showed right total knee arthroplasty in good position and alignment.  Examination showed range of motion -5 to 95 degrees.  Discussed with the patient examination and imaging findings.  Discussed with the patient the recovery from total knee arthroplasty, including physical therapy and DVT prophylaxis.  Patient was given a referral to outpatient physical therapy for when she is discharged from rehab.  Patient would like to be discharged from rehab prior to 6/28/2023.  Discussed continuing her Xarelto for a total of 6 weeks for her DVT prophylaxis.  Discussed that patient may shower, but should not soak the wound.  Patient will follow-up in 4 weeks for reevaluation and management.  Patient understanding and in agreement with plan.  All questions answered.\par \par Plan:\par -Physical therapy\par -DVT Prophylaxis: Xarelto for 6 weeks\par -Patient may shower, but should not soak the wound\par -Follow-up in 4 weeks for reevaluation and management

## 2023-06-20 NOTE — PHYSICAL EXAM
[de-identified] : Constitutional: 73 year old female, alert and oriented, cooperative, in no acute distress.\par \par HEENT \par NC/AT.  Appearance: symmetric\par \par Neck/Back\par Straight without deformity or instability.  Good ROM.\par \par Chest/Respiratory \par Respiratory effort: no intercostal retractions or use of accessory muscles. Nonlabored Breathing\par \par Skin \par On inspection, warm and dry without rashes or lesions.\par \par Mental Status: \par Judgment, insight: intact\par Orientation: oriented to time, place, and person\par \par Neurological:\par Sensory and Motor are grossly intact throughout\par \par Right Knee\par \par Inspection:\par     Incision well healing. No erythema or drainage\par     Mild Effusion\par     Non-tender to palpation over tibial tubercle, patella, lateral joint line, and pes insertion.\par     Mild tenderness over the medial joint line (anteriorly)\par \par Range of Motion:\par 	Extension - -5 degrees\par 	Flexion - 90 degrees\par 	Alignment - Valgus 3 degrees\par 	Extensor lag: None\par \par Stability:\par      Demonstrates no Varus or Valgus instability\par      Negative Anterior or Posterior drawer.\par      No mid flexion instability\par \par Patella: stable, tracks well. \par \par Neurologic Exam\par     Motor intact including 5/5 Extensor Hallucis Longus, 5/5 Flexor Hallucis Longus, 5/5 Tibialis Anterior and 5/5 Gastrocnemius\par     Sensation Intact to Light Touch including Saphenous, Sural, Superficial Peroneal, Deep Peroneal, Tibial nerve distributions\par \par Vascular Exam\par     Foot is warm and well perfused with 2+ Dorsalis Pedis Pulse \par \par No pain with range of motion of the bilateral hips or left knee. No lumbar paraspinal muscle tenderness.  [de-identified] : XRay:  XRays of the Right Knee (3 Views) taken in the office today and reviewed with the patient. XRays demonstrate a Right Total Knee Arthroplasty in good position and alignment. There is no obvious evidence of fracture, dislocation, osteolysis or loosening. (my personal interpretation)\par Components: Lana [employer]athlon CR Cemented

## 2023-07-20 ENCOUNTER — APPOINTMENT (OUTPATIENT)
Dept: ORTHOPEDIC SURGERY | Facility: CLINIC | Age: 73
End: 2023-07-20
Payer: MEDICAID

## 2023-07-20 PROCEDURE — 99024 POSTOP FOLLOW-UP VISIT: CPT

## 2023-07-20 PROCEDURE — 73562 X-RAY EXAM OF KNEE 3: CPT | Mod: RT

## 2023-07-27 NOTE — CONSULT NOTE ADULT - PROBLEM SELECTOR RECOMMENDATION 2
FS stable  diabetic diet  c/w Insulin sliding scale  restart home glipizide and januvia on discharge Cosentyx Counseling:  I discussed with the patient the risks of Cosentyx including but not limited to worsening of Crohn's disease, immunosuppression, allergic reactions and infections.  The patient understands that monitoring is required including a PPD at baseline and must alert us or the primary physician if symptoms of infection or other concerning signs are noted.

## 2023-08-02 NOTE — HISTORY OF PRESENT ILLNESS
[de-identified] : 7/20/2023  Miracle Latif presented to the office for follow up of her right total knee arthroplasty.  Patient underwent right TKA on 6/5/2023. Patient is overall doing well. She has some pain with stairs, but her pain has improved. She states that her pain is improved compared to prior to surgery. Patient has been walking well in the home. She is not using assistive devices at this time. No falls or injuries. However, patient has not been in PT since she was discharged from rehab on 7/7/2023. She has completed her Xarelto for DVT prophylaxis.  6/20/2023 [facility]  ID: [id number]   Miracle Latif is a 73-year-old female who presents to the office for follow-up of her right total knee arthroplasty.  Patient underwent right TKA on 6/5/2023.  She was discharged to rehab.  Patient is currently doing well at rehab.  She is in physical therapy.  She states that her pain is improving, but she still has some knee pain.  Patient is currently on Xarelto for her DVT prophylaxis (history of aspirin allergy).  No fevers or chills.  5/25/2023 [facility]  ID: [id number]  Miracle Latif presented to the office for follow-up of her bilateral knee pain.  Patient has been experiencing bilateral (right greater than left) knee pain for about 5 years.  She continues to have significant knee pain.  Pain is worse with standing, sitting, and walking.  She has been in physical therapy, which gives some relief and her knee range of motion is doing well.  However, she continues to have difficulty bending the knee, especially when sitting.  Her pain continues to affect her quality of life.  She has tried physical therapy, corticosteroid injections (8 times, last in May 2022), and pain medications.  Patient is unable to take NSAIDs due to her gastritis.  Patient was seen by her primary care physician, who did an EKG and patient underwent blood work on 5/20/2023.  She is waiting the results of her blood work for medical clearance.  Of note, patient reports a possible issue or allergy with aspirin about 30 to 40 years ago.  PCP: Marina Scott MD, [phone]  4/20/2023 [facility]  ID: [id number]  Ms. Latif is a 73-year-old female presents to the office for evaluation of her bilateral knee pain.  Patient has been experiencing bilateral (right greater than left) knee pain for about 5 years.  Pain is located over the anterior and posterior knee bilaterally.  Pain is worse with stairs, walking, and sitting down.  Patient is unable to walk 1 block due to the pain.  Pain is improved with topical ointments.  She has tried gabapentin.  Patient is unable to tolerate NSAIDs due to history of gastritis.  Patient had tried physical therapy in 2015, which gave some relief.  She has tried a total of 8 corticosteroid injections, last in May 2022, which helped for about 6 to 7 months.  Pain is now affecting her quality of life.  No fevers or chills.  History: Diabetes (Last A1c: 6.3), Arthritis, HLD, HTN, Gastritis, Breast Cancer

## 2023-08-02 NOTE — DISCUSSION/SUMMARY
[de-identified] : Miracle Latif is a 73-year-old female presents to the office for follow-up of her right total knee arthroplasty.  Patient underwent right TKA on 6/5/2023.  She is currently doing well overall.  X-rays showed right total knee arthroplasty in good position and alignment.  Examination showed range of motion 0 to 95 degrees.  Discussed with the patient examination and imaging findings.  Discussed with the patient the recovery from total knee arthroplasty, including physical therapy and range of motion exercises. Patient was given another referral to physical therapy. Emphasized range of motion exercises and strengthening. Discussed that if patient does not continue to improve range of motion, a manipulation under anesthesia may be required. Patient will follow up in 2 weeks for reevaluation and management, evaluation of range of motion. Patient understanding and in agreement with the plan. All questions answered.  Plan: -Physical therapy -DVT Prophylaxis: Completed -Encouraged ROM exercises and strengthening -Follow-up in 2 weeks for reevaluation and management, evaluation of ROM

## 2023-08-02 NOTE — PHYSICAL EXAM
[de-identified] : Constitutional: 73 year old female, alert and oriented, cooperative, in no acute distress.  HEENT  NC/AT.  Appearance: symmetric  Neck/Back Straight without deformity or instability.  Good ROM.  Chest/Respiratory  Respiratory effort: no intercostal retractions or use of accessory muscles. Nonlabored Breathing  Skin  On inspection, warm and dry without rashes or lesions.  Mental Status:  Judgment, insight: intact Orientation: oriented to time, place, and person  Neurological: Sensory and Motor are grossly intact throughout  Right Knee  Inspection:     Incision well healing. No erythema or drainage     No Effusion     Non-tender to palpation over tibial tubercle, patella, medial and lateral joint line, and pes insertion.  Range of Motion: 	Extension - 0 degrees 	Flexion - 95 degrees 	Alignment - Valgus 3 degrees 	Extensor lag: None  Stability:      Demonstrates no Varus or Valgus instability      Negative Anterior or Posterior drawer.      No mid flexion instability  Patella: stable, tracks well.   Neurologic Exam     Motor intact including 5/5 Extensor Hallucis Longus, 5/5 Flexor Hallucis Longus, 5/5 Tibialis Anterior and 5/5 Gastrocnemius     Sensation Intact to Light Touch including Saphenous, Sural, Superficial Peroneal, Deep Peroneal, Tibial nerve distributions  Vascular Exam     Foot is warm and well perfused with 2+ Dorsalis Pedis Pulse   No pain with range of motion of the bilateral hips or left knee. No lumbar paraspinal muscle tenderness.  [de-identified] : XRay:  XRays of the Right Knee (3 Views) taken in the office today and reviewed with the patient. XRays demonstrate a Right Total Knee Arthroplasty in good position and alignment. There is no obvious evidence of fracture, dislocation, osteolysis or loosening. (my personal interpretation)\par Components: Lana Triathlon CR Cemented

## 2023-08-03 ENCOUNTER — APPOINTMENT (OUTPATIENT)
Dept: ORTHOPEDIC SURGERY | Facility: CLINIC | Age: 73
End: 2023-08-03

## 2023-08-29 ENCOUNTER — APPOINTMENT (OUTPATIENT)
Dept: ORTHOPEDIC SURGERY | Facility: CLINIC | Age: 73
End: 2023-08-29
Payer: MEDICAID

## 2023-08-29 DIAGNOSIS — Z96.659 PRESENCE OF UNSPECIFIED ARTIFICIAL KNEE JOINT: ICD-10-CM

## 2023-08-29 PROCEDURE — 73562 X-RAY EXAM OF KNEE 3: CPT | Mod: RT

## 2023-08-29 PROCEDURE — 99024 POSTOP FOLLOW-UP VISIT: CPT

## 2023-09-03 PROBLEM — Z96.659 S/P TOTAL KNEE ARTHROPLASTY: Status: ACTIVE | Noted: 2023-06-20

## 2023-09-03 NOTE — HISTORY OF PRESENT ILLNESS
[de-identified] : 8/29/2023 Translated by Patient's Son  Miracle Latif is a 73-year-old female who presents to the office for follow-up of her right total knee arthroplasty.  Patient underwent right TKA on 6/5/2023.  She is doing well overall.  She has some occasional knee pain, but feels that the pain is improving.  Patient reports some minor pain over the distal quadriceps and posteromedial knee.  She has been working in physical therapy, which is helping.  Patient has been walking 3-4 blocks and climb stairs.  She states that she is overall happy with the knee and is satisfied with her knee flexion.  Patient and her family had decided against any NAVDEEP.  No falls or injuries.  No fevers or chills.  7/20/2023  Miracle Latif presented to the office for follow up of her right total knee arthroplasty.  Patient underwent right TKA on 6/5/2023. Patient is overall doing well. She has some pain with stairs, but her pain has improved. She states that her pain is improved compared to prior to surgery. Patient has been walking well in the home. She is not using assistive devices at this time. No falls or injuries. However, patient has not been in PT since she was discharged from rehab on 7/7/2023. She has completed her Xarelto for DVT prophylaxis.  6/20/2023 [facility]  ID: [id number]   Miracle Latif is a 73-year-old female who presents to the office for follow-up of her right total knee arthroplasty.  Patient underwent right TKA on 6/5/2023.  She was discharged to rehab.  Patient is currently doing well at rehab.  She is in physical therapy.  She states that her pain is improving, but she still has some knee pain.  Patient is currently on Xarelto for her DVT prophylaxis (history of aspirin allergy).  No fevers or chills.  5/25/2023 [facility]  ID: [id number]  Miracle Latif presented to the office for follow-up of her bilateral knee pain.  Patient has been experiencing bilateral (right greater than left) knee pain for about 5 years.  She continues to have significant knee pain.  Pain is worse with standing, sitting, and walking.  She has been in physical therapy, which gives some relief and her knee range of motion is doing well.  However, she continues to have difficulty bending the knee, especially when sitting.  Her pain continues to affect her quality of life.  She has tried physical therapy, corticosteroid injections (8 times, last in May 2022), and pain medications.  Patient is unable to take NSAIDs due to her gastritis.  Patient was seen by her primary care physician, who did an EKG and patient underwent blood work on 5/20/2023.  She is waiting the results of her blood work for medical clearance.  Of note, patient reports a possible issue or allergy with aspirin about 30 to 40 years ago.  PCP: Marina Scott MD, [phone]  4/20/2023 [facility]  ID: [id number]  Ms. Latif is a 73-year-old female presents to the office for evaluation of her bilateral knee pain.  Patient has been experiencing bilateral (right greater than left) knee pain for about 5 years.  Pain is located over the anterior and posterior knee bilaterally.  Pain is worse with stairs, walking, and sitting down.  Patient is unable to walk 1 block due to the pain.  Pain is improved with topical ointments.  She has tried gabapentin.  Patient is unable to tolerate NSAIDs due to history of gastritis.  Patient had tried physical therapy in 2015, which gave some relief.  She has tried a total of 8 corticosteroid injections, last in May 2022, which helped for about 6 to 7 months.  Pain is now affecting her quality of life.  No fevers or chills.  History: Diabetes (Last A1c: 6.3), Arthritis, HLD, HTN, Gastritis, Breast Cancer

## 2023-09-03 NOTE — PHYSICAL EXAM
[de-identified] : Constitutional: 73 year old female, alert and oriented, cooperative, in no acute distress.  HEENT  NC/AT.  Appearance: symmetric  Neck/Back Straight without deformity or instability.  Good ROM.  Chest/Respiratory  Respiratory effort: no intercostal retractions or use of accessory muscles. Nonlabored Breathing  Skin  On inspection, warm and dry without rashes or lesions.  Mental Status:  Judgment, insight: intact Orientation: oriented to time, place, and person  Neurological: Sensory and Motor are grossly intact throughout  Right Knee  Inspection:     Incision well healing. No erythema or drainage     No Effusion     Non-tender to palpation over tibial tubercle, patella, medial and lateral joint line, and pes insertion.  Range of Motion: 	Extension - 0 degrees 	Flexion - 110 degrees 	Alignment - Valgus 3 degrees 	Extensor lag: None  Stability:      Demonstrates no Varus or Valgus instability      Negative Anterior or Posterior drawer.      No mid flexion instability  Patella: stable, tracks well.   Neurologic Exam     Motor intact including 5/5 Extensor Hallucis Longus, 5/5 Flexor Hallucis Longus, 5/5 Tibialis Anterior and 5/5 Gastrocnemius     Sensation Intact to Light Touch including Saphenous, Sural, Superficial Peroneal, Deep Peroneal, Tibial nerve distributions  Vascular Exam     Foot is warm and well perfused with 2+ Dorsalis Pedis Pulse   No pain with range of motion of the bilateral hips or left knee. No lumbar paraspinal muscle tenderness.  [de-identified] : XRay:  XRays of the Right Knee (3 Views) taken in the office today and reviewed with the patient. XRays demonstrate a Right Total Knee Arthroplasty in good position and alignment. There is no obvious evidence of fracture, dislocation, osteolysis or loosening. (my personal interpretation)\par  Components: Lana Triathlon CR Cemented

## 2023-09-03 NOTE — REVIEW OF SYSTEMS
[Negative] : Endocrine [Joint Pain] : no joint pain [Joint Swelling] : no joint swelling [Joint Stiffness] : no joint stiffness [Fever] : no fever [Chills] : no chills

## 2023-09-03 NOTE — DISCUSSION/SUMMARY
[de-identified] : Miracle Latif is a 73-year-old female presented to the office for follow-up of her right total knee arthroplasty.  Patient underwent right TKA on 6/5/2023.  She is currently doing well overall.  X-rays showed right total knee arthroplasty in good position and alignment.  Examination showed range of motion 0 to 110 degrees.  Discussed with the patient examination and imaging findings.  Discussed with the patient the recovery from total knee arthroplasty, including physical therapy and range of motion exercises. Patient will continue her physical therapy. Emphasized range of motion exercises and strengthening.  Patient stated that she is happy with her TKA and her knee range of motion.  She would like an orthopedic shoe.  Patient was given a prescription for an orthopedic shoe.  Patient will follow-up in 3 months for reevaluation and management.  Patient and his son understanding and in agreement the plan.  All questions answered.   Plan: -Physical therapy -DVT Prophylaxis: Completed -Encouraged ROM exercises and strengthening -Orthopedic Shoe as needed -Follow-up in 3 months for reevaluation and management

## 2023-11-06 NOTE — PATIENT PROFILE ADULT - FUNCTIONAL ASSESSMENT - DAILY ACTIVITY 4.
"  Caller: Fatuma Cahudhary \"ROLAN\"    [facility]: YES      " 3 = A little assistance

## 2023-11-30 ENCOUNTER — APPOINTMENT (OUTPATIENT)
Dept: ORTHOPEDIC SURGERY | Facility: CLINIC | Age: 73
End: 2023-11-30

## 2024-03-20 NOTE — ASU PATIENT PROFILE, ADULT - PATIENT KNOW
Quality 226: Preventive Care And Screening: Tobacco Use: Screening And Cessation Intervention: Patient screened for tobacco use and is an ex/non-smoker Detail Level: Detailed yes

## 2024-10-08 NOTE — PATIENT PROFILE ADULT - LANGUAGE ASSISTANCE NEEDED
Yes-Patient/Caregiver accepts free interpretation services... [Nl] : no feeding issues at this time. [Acting Fussy] : not acting ~L fussy [Fever] : no fever [Wgt Loss (___ Lbs)] : no recent weight loss [Pallor] : not pale [Discharge] : no discharge [Redness] : no redness [Nasal Discharge] : no nasal discharge [Nasal Stuffiness] : no nasal congestion [Stridor] : no stridor [Cyanosis] : no cyanosis [Edema] : no edema [Diaphoresis] : not diaphoretic [Tachypnea] : not tachypneic [Wheezing] : no wheezing [Cough] : no cough [Being A Poor Eater] : not a poor eater [Vomiting] : no vomiting [Diarrhea] : no diarrhea [Decrease In Appetite] : appetite not decreased [Fainting (Syncope)] : no fainting [Dec Consciousness] :  no decrease in consciousness [Seizure] : no seizures [Hypotonicity (Flaccid)] : not hypotonic [Refusal to Bear Wgt] : normal weight bearing [Puffy Hands/Feet] : no hand/feet puffiness [Rash] : no rash [Hemangioma] : no hemangioma [Jaundice] : no jaundice [Wound problems] : no wound problems [Bruising] : no tendency for easy bruising [Swollen Glands] : no lymphadenopathy [Enlarged [place]] : the fontanelle was not enlarged [Hoarse Cry] : no hoarse cry [Failure To Thrive] : no failure to thrive [Vaginal Discharge] : no vaginal discharge [Ambiguous Genitals] : genitals not ambiguous [Dec Urine Output] : no oliguria

## 2024-10-09 NOTE — ASU PATIENT PROFILE, ADULT - IS PATIENT PREGNANT?
[facility]  Office: [phone]  Fady Flores DO, Chris Medina DO, Nick Yarbrough DO, Gary Wei DO, Aileen Lloyd MD, Marie Astorga MD, Jacob Kelly MD, Zaira Estrella MD,  German Ramos MD, Vi Castillo MD, Sigifredo Schwartz MD,  Denice Streeter DO, Sebastián Suggs MD, Tye Boateng MD, Stan Flores DO, Nida Wilhelm MD,  Damir Herrera DO, Mallory Brasher MD, Rosana Escalante MD, Conchis Jiménez MD, Salazar Trinh MD,  Santos Rai MD, Jonna Delgado MD, Raymundo Ambrocio MD, Silver Fisher MD, Angel Dhaliwal MD, Michael Lewis MD, Maxwell Sifuentes, DO, Camron Salas DO, Dillon Brizuela DO, Delvin Leija MD, Shirley Waterhouse, CNP,  Kasie Early CNP, Maxwell Lugo, CNP,  Desiree Hannah, [facility], Stacie Moss, CNP, Rosangela Kennedy, CNP, Lauren Franco, CNP, Layla Fritz, CNP, Leandra Vega, PA-C, Ivory Cool PA-C, Taina Cunningham, CNP, Denys Pond, CNP,  Anusha Romero, CNP, Twila Montgomery, CNP, Gretchen Rudolph, CNP, Maria De Jesus Sebastian, CNS, Michelle Thapa, CNP, Dulce Sky, CNP, Tracy Schwab, CNP         [facility]   IN-PATIENT SERVICE   [facility]    Discharge Summary     Patient ID: Abdifatah De Anda  :  1984   MRN: [id number]     ACCOUNT:  [id number]   Patient's PCP: Brennon Reyes DO  Admit Date: 10/1/2024   Discharge Date: 10/10/2024     Length of Stay: 9  Code Status:  Full Code  Admitting Physician: Aileen Lloyd MD  Discharge Physician: Aileen Lloyd MD     Active Discharge Diagnoses:     Hospital Problem Lists:  Principal Problem:    Perforation of sigmoid colon due to diverticulitis  Active Problems:    Ankylosing spondylitis (HCC)    Arthritis, rheumatoid (HCC)    Alcohol abuse    Benign essential hypertension    Intra-abdominal free air of unknown etiology    Intra-abdominal abscess (HCC)  Resolved Problems:    * No resolved hospital problems. *      Admission Condition:  poor     Discharged Condition: stable    Hospital Stay:  tablet  Commonly known as: DIFLUCAN  Take 1 tablet by mouth daily for 7 days     ondansetron 4 MG disintegrating tablet  Commonly known as: ZOFRAN-ODT  Take 1 tablet by mouth 3 times daily as needed for Nausea or Vomiting     oxyCODONE 5 MG immediate release tablet  Commonly known as: Roxicodone  Take 1 tablet by mouth every 6 hours as needed for Pain for up to 7 days. Intended supply: 5 days. Take lowest dose possible to manage pain Max Daily Amount: 20 mg            CHANGE how you take these medications      lisinopril 40 MG tablet  Commonly known as: PRINIVIL;ZESTRIL  Take 1 tablet by mouth daily  What changed:   medication strength  how much to take     metroNIDAZOLE 500 MG tablet  Commonly known as: FLAGYL  Take 1 tablet by mouth every 8 hours for 7 days  What changed: when to take this            CONTINUE taking these medications      ciprofloxacin 500 MG tablet  Commonly known as: CIPRO  Take 1 tablet by mouth 2 times daily for 7 days     ibuprofen 600 MG tablet  Commonly known as: ADVIL;MOTRIN  Take 1 tablet by mouth 3 times daily as needed for Pain     omeprazole 20 MG delayed release capsule  Commonly known as: PRILOSEC            STOP taking these medications      thiamine 100 MG/ML injection  Commonly known as: B-1               Where to Get Your Medications        These medications were sent to [id number] - [place], [place]  [facility] - P [phone] - F [phone]  [address] [place] [id number]      Phone: [phone]   ciprofloxacin 500 MG tablet  docusate sodium 100 MG capsule  fluconazole 200 MG tablet  lisinopril 40 MG tablet  metroNIDAZOLE 500 MG tablet  ondansetron 4 MG disintegrating tablet  oxyCODONE 5 MG immediate release tablet         No discharge procedures on file.    Time Spent on discharge is  35 mins in patient examination, evaluation, counseling as well as medication reconciliation, prescriptions for required medications, discharge plan and  no

## (undated) DEVICE — GLV 8 PROTEXIS (CREAM) MICRO

## (undated) DEVICE — VENODYNE/SCD SLEEVE CALF MEDIUM

## (undated) DEVICE — DRAPE SPLIT SHEET 77" X 120"

## (undated) DEVICE — SUT QUILL PDO 2 36CM 40MM

## (undated) DEVICE — SUT VICRYL 2-0 27" PS-2 UNDYED

## (undated) DEVICE — SUT STRATAFIX SPIRAL MONOCRYL PLUS 4-0 70CM PS-1 UNDYED

## (undated) DEVICE — ELCTR BOVIE TIP BLADE INSULATED 6.5" EDGE

## (undated) DEVICE — TAPE SILK 3"

## (undated) DEVICE — WOUND IRR SURGIPHOR

## (undated) DEVICE — SUT STRATAFIX SPIRAL MONOCRYL 3-0 30CM RB-1 UNDYED

## (undated) DEVICE — MAKO BLADE NARROW

## (undated) DEVICE — STRYKER MIXEVAC 3 BONE CEMENT MIXER

## (undated) DEVICE — CANISTER DISPOSABLE THIN WALL 3000CC

## (undated) DEVICE — TOURNIQUET ESMARK 6"

## (undated) DEVICE — DRSG ACE BANDAGE 6"

## (undated) DEVICE — NDL HYPO SAFE 21G X 1.5" (GREEN)

## (undated) DEVICE — DRAPE 3/4 SHEET 52X76"

## (undated) DEVICE — DRSG AQUACEL 3.5 X 12"

## (undated) DEVICE — SUT VICRYL 1 36" CTX UNDYED

## (undated) DEVICE — SUT VICRYL 0 27" OS-6 UNDYED

## (undated) DEVICE — PACK LIJ BASIC ORTHO

## (undated) DEVICE — ELCTR GROUNDING PAD ADULT COVIDIEN

## (undated) DEVICE — TOURNIQUET CUFF 34" DUAL PORT W PLC

## (undated) DEVICE — PROTECTOR HEEL / ELBOW

## (undated) DEVICE — LABELS BLANK W PEN

## (undated) DEVICE — DRSG AQUACEL 3.5 X 14"

## (undated) DEVICE — SUT QUILL MONODERM 3-0 30CM 24MM

## (undated) DEVICE — DRSG STOCKINETTE IMPERVIOUS XL

## (undated) DEVICE — DRAPE EXTREMITY 87" X 106" X 128"

## (undated) DEVICE — SUCTION YANKAUER NO CONTROL VENT

## (undated) DEVICE — PACK TOTAL JOINT

## (undated) DEVICE — DRSG DERMABOND 0.7ML

## (undated) DEVICE — ELCTR BOVIE PENCIL SMOKE EVACUATION

## (undated) DEVICE — MAKO VIZADISC KNEE TRACKING KIT

## (undated) DEVICE — HOOD T5 PEELAWAY

## (undated) DEVICE — SAW BLADE STRYKER THCK LONG 1.24X83.5X18.5MM

## (undated) DEVICE — GOWN XXL

## (undated) DEVICE — SAW BLADE STRYKER SAGITTAL 3 HOLE OSCILLATING

## (undated) DEVICE — HANDPIECE INTERPULSE W/ COAXIAL FAN SPRAY TIP

## (undated) DEVICE — SOL IRR POUR NS 0.9% 1500ML

## (undated) DEVICE — PREP CHLORAPREP HI-LITE ORANGE 26ML

## (undated) DEVICE — POSITIONER STRAP ARMBOARD VELCRO TS-30

## (undated) DEVICE — DRAPE U POLY BLUE 60"X60"

## (undated) DEVICE — DRSG COBAN 6"

## (undated) DEVICE — MAKO DRAPE KIT

## (undated) DEVICE — DRSG AQUACEL 3.5 X 10"

## (undated) DEVICE — FRAZIER SUCTION TIP 8FR

## (undated) DEVICE — SOL IRR BAG NS 0.9% 3000ML

## (undated) DEVICE — SAW BLADE STRYKER RECIPROCATING DOUBLE EDGE 68X12.5MM

## (undated) DEVICE — DRSG CURITY GAUZE SPONGE 4 X 4" 12-PLY

## (undated) DEVICE — SOL IRR POUR H2O 1500ML

## (undated) DEVICE — MAKO CHECKPOINT KIT FEMORAL / TIBIAL

## (undated) DEVICE — SYR LUER LOK 20CC

## (undated) DEVICE — MAKO BLADE STANDARD

## (undated) DEVICE — WARMING BLANKET FULL ADULT